# Patient Record
Sex: MALE | Race: BLACK OR AFRICAN AMERICAN | NOT HISPANIC OR LATINO | Employment: FULL TIME | URBAN - METROPOLITAN AREA
[De-identification: names, ages, dates, MRNs, and addresses within clinical notes are randomized per-mention and may not be internally consistent; named-entity substitution may affect disease eponyms.]

---

## 2018-03-03 ENCOUNTER — HOSPITAL ENCOUNTER (EMERGENCY)
Facility: OTHER | Age: 23
Discharge: HOME | End: 2018-03-03
Attending: EMERGENCY MEDICINE
Payer: COMMERCIAL

## 2018-03-03 VITALS
TEMPERATURE: 98 F | HEART RATE: 88 BPM | SYSTOLIC BLOOD PRESSURE: 147 MMHG | OXYGEN SATURATION: 97 % | BODY MASS INDEX: 37.11 KG/M2 | WEIGHT: 280 LBS | RESPIRATION RATE: 16 BRPM | HEIGHT: 73 IN | DIASTOLIC BLOOD PRESSURE: 85 MMHG

## 2018-03-03 DIAGNOSIS — J02.9 VIRAL PHARYNGITIS: ICD-10-CM

## 2018-03-03 DIAGNOSIS — G44.209 TENSION HEADACHE: Primary | ICD-10-CM

## 2018-03-03 LAB
CTP QC/QA: YES
S PYO RRNA THROAT QL PROBE: NEGATIVE

## 2018-03-03 PROCEDURE — 63600175 PHARM REV CODE 636 W HCPCS: Performed by: NURSE PRACTITIONER

## 2018-03-03 PROCEDURE — 99283 EMERGENCY DEPT VISIT LOW MDM: CPT | Mod: 25

## 2018-03-03 PROCEDURE — 87880 STREP A ASSAY W/OPTIC: CPT

## 2018-03-03 PROCEDURE — 25000003 PHARM REV CODE 250: Performed by: NURSE PRACTITIONER

## 2018-03-03 PROCEDURE — 96372 THER/PROPH/DIAG INJ SC/IM: CPT

## 2018-03-03 RX ORDER — DEXAMETHASONE SODIUM PHOSPHATE 4 MG/ML
8 INJECTION, SOLUTION INTRA-ARTICULAR; INTRALESIONAL; INTRAMUSCULAR; INTRAVENOUS; SOFT TISSUE
Status: COMPLETED | OUTPATIENT
Start: 2018-03-03 | End: 2018-03-03

## 2018-03-03 RX ORDER — IBUPROFEN 400 MG/1
800 TABLET ORAL
Status: COMPLETED | OUTPATIENT
Start: 2018-03-03 | End: 2018-03-03

## 2018-03-03 RX ADMIN — IBUPROFEN 800 MG: 400 TABLET, FILM COATED ORAL at 11:03

## 2018-03-03 RX ADMIN — DEXAMETHASONE SODIUM PHOSPHATE 8 MG: 4 INJECTION, SOLUTION INTRAMUSCULAR; INTRAVENOUS at 11:03

## 2018-03-03 NOTE — ED PROVIDER NOTES
"Encounter Date: 3/3/2018       History     Chief Complaint   Patient presents with    Headache     pt states "I started having a headache yesterday at work and it has gotten worse" "I took ibuprofen but it hasn't helped"    Sore Throat     pt states "I've been having a sore throat x 4 days"     The history is provided by the patient. No  was used.   Headache    This is a new problem. The current episode started yesterday. The problem occurs intermittently. The problem has been waxing and waning. The pain is located in the parietal and right unilateral region. The pain does not radiate. The pain quality is similar to prior headaches. The quality of the pain is described as aching. The pain is at a severity of 6/10. Associated symptoms include a sore throat. Pertinent negatives include no abdominal pain, abnormal behavior, anorexia, back pain, blurred vision, coughing, dizziness, drainage, ear pain, eye pain, eye redness, eye watering, facial sweating, fever, hearing loss, insomnia, loss of balance, muscle aches, nausea, neck pain, numbness, phonophobia, photophobia, rhinorrhea, sinus pressure, swollen glands, tingling, tinnitus, visual change, vomiting, weakness or weight loss. The symptoms are aggravated by activity, noise and bright light. He has tried NSAIDs (Patient reports ibuprofen 800 mg resolved the headache.  Patient states that the headache came back however.  Patient did not take another ibuprofen for the headache recurrence.) for the symptoms. The treatment provided significant relief. (Patient reports that he does not drink 68 glasses of water daily.  Patient does report a past medical history of headaches.)   Sore Throat   This is a new problem. The current episode started more than 2 days ago. The problem occurs constantly. The problem has not changed since onset.Associated symptoms include headaches. Pertinent negatives include no abdominal pain. The symptoms are aggravated by " swallowing and drinking.     Review of patient's allergies indicates:  No Known Allergies  History reviewed. No pertinent past medical history.  History reviewed. No pertinent surgical history.  Family History   Problem Relation Age of Onset    Hypertension Mother      Social History   Substance Use Topics    Smoking status: Never Smoker    Smokeless tobacco: Never Used    Alcohol use Not on file     Review of Systems   Constitutional: Negative.  Negative for fever and weight loss.   HENT: Positive for sore throat. Negative for congestion, ear pain, hearing loss, rhinorrhea, sinus pressure and tinnitus.    Eyes: Negative.  Negative for blurred vision, photophobia, pain and redness.   Respiratory: Negative.  Negative for cough.    Cardiovascular: Negative.    Gastrointestinal: Negative.  Negative for abdominal pain, anorexia, nausea and vomiting.   Genitourinary: Negative.    Musculoskeletal: Negative.  Negative for back pain and neck pain.   Skin: Negative.    Allergic/Immunologic: Negative.    Neurological: Positive for headaches. Negative for dizziness, tingling, weakness, numbness and loss of balance.   Psychiatric/Behavioral: Negative.  The patient does not have insomnia.    All other systems reviewed and are negative.      Physical Exam     Initial Vitals [03/03/18 1110]   BP Pulse Resp Temp SpO2   138/60 98 18 99.2 °F (37.3 °C) 97 %      MAP       86         Physical Exam    Nursing note and vitals reviewed.  Constitutional: He appears well-developed and well-nourished. He is not diaphoretic.  Non-toxic appearance. He does not appear ill. No distress.   HENT:   Head: Normocephalic and atraumatic.   Nose: No mucosal edema or rhinorrhea. Right sinus exhibits no maxillary sinus tenderness and no frontal sinus tenderness. Left sinus exhibits no maxillary sinus tenderness and no frontal sinus tenderness.   Mouth/Throat: Uvula is midline, oropharynx is clear and moist and mucous membranes are normal. No uvula  swelling. No oropharyngeal exudate, posterior oropharyngeal edema, posterior oropharyngeal erythema or tonsillar abscesses.   Eyes: Conjunctivae are normal.   Neck: Normal range of motion.   Cardiovascular: Normal rate, regular rhythm, normal heart sounds and intact distal pulses. Exam reveals no gallop and no friction rub.    No murmur heard.  Pulmonary/Chest: Breath sounds normal. No respiratory distress. He has no wheezes. He has no rhonchi. He has no rales. He exhibits no tenderness.   Musculoskeletal: Normal range of motion.   Neurological: He is alert and oriented to person, place, and time. He has normal strength. He displays no atrophy and no tremor. No cranial nerve deficit or sensory deficit. He exhibits normal muscle tone. He displays a negative Romberg sign. He displays no seizure activity. Coordination and gait normal. GCS eye subscore is 4. GCS verbal subscore is 5. GCS motor subscore is 6.   Skin: Skin is warm and dry. No rash noted.   Psychiatric: He has a normal mood and affect. His behavior is normal. Judgment and thought content normal.         ED Course   Procedures  Labs Reviewed   POCT RAPID STREP A             Medical Decision Making:   Initial Assessment:   Tension headache, viral pharyngitis  Differential Diagnosis:   Strep, URI  Clinical Tests:   Lab Tests: Ordered and Reviewed       <> Summary of Lab: Rapid strep negative  ED Management:  Decadron IM and ibuprofen 800 mg given to the patient in the ER.  After 30 minutes the patient reports resolution of pain and states he feels better.  Patient will be discharged home with instructions to take ibuprofen 800 mg as needed for headache, rest, refrain from strenuous activity, drink 6-8 glasses of water daily.  The patient is also instructed that his sore throat is likely viral and should subside on its own over time.  Patient is instructed to eat and drink foods and liquids at room temperature.  Patient also is instructed to return to the ER  as needed if symptoms worsen or fail to improve as well as follow-up with his primary care provider in 2 days.  The patient verbalized an understanding of discharge instructions and treatment plan.                      Clinical Impression:   The primary encounter diagnosis was Tension headache. A diagnosis of Viral pharyngitis was also pertinent to this visit.                           Toussaint Battley III, TIM  03/03/18 1247

## 2018-10-07 ENCOUNTER — HOSPITAL ENCOUNTER (EMERGENCY)
Facility: HOSPITAL | Age: 23
Discharge: HOME | End: 2018-10-07
Attending: EMERGENCY MEDICINE
Payer: COMMERCIAL

## 2018-10-07 VITALS
BODY MASS INDEX: 37.11 KG/M2 | HEIGHT: 73 IN | SYSTOLIC BLOOD PRESSURE: 144 MMHG | TEMPERATURE: 98 F | WEIGHT: 280 LBS | DIASTOLIC BLOOD PRESSURE: 88 MMHG | HEART RATE: 84 BPM | RESPIRATION RATE: 18 BRPM | OXYGEN SATURATION: 95 %

## 2018-10-07 DIAGNOSIS — K52.9 GASTROENTERITIS: Primary | ICD-10-CM

## 2018-10-07 PROCEDURE — 99283 EMERGENCY DEPT VISIT LOW MDM: CPT

## 2018-10-07 RX ORDER — ONDANSETRON 4 MG/1
4 TABLET, ORALLY DISINTEGRATING ORAL EVERY 8 HOURS PRN
Qty: 14 TABLET | Refills: 1 | Status: SHIPPED | OUTPATIENT
Start: 2018-10-07

## 2018-10-07 NOTE — ED PROVIDER NOTES
"Encounter Date: 10/7/2018       History     Chief Complaint   Patient presents with    Emesis     "I was supposed to be at work for 5 this morning but when I woke up I was vomiting and had loose bm". Reports 3 episodes of emesis and a "good amount" of loose stools.     Chief complaint:  Vomiting and diarrhea  23-year-old male awoke at 3:00 a.m. with upset stomach.  Patient went to the restroom and had diarrhea.  He has had diarrhea and vomiting intermittently between 3 and 5:00 a.m..  Last time he vomited was at 4 a.m..  His last diarrheal stool was 5:00 a.m..  Patient has no complaints at this time.  He says that he called off work today and needs a note.  No fever, weakness or dizziness.  No sick contacts.  He did eat Chinese food around 830 last night.  For      The history is provided by the patient.     Review of patient's allergies indicates:  No Known Allergies  History reviewed. No pertinent past medical history.  History reviewed. No pertinent surgical history.  Family History   Problem Relation Age of Onset    Hypertension Mother      Social History     Tobacco Use    Smoking status: Never Smoker    Smokeless tobacco: Never Used   Substance Use Topics    Alcohol use: Yes     Frequency: Never     Comment: 3 x a week    Drug use: No     Review of Systems   Constitutional: Negative for fever.   Respiratory: Negative for shortness of breath.    Cardiovascular: Negative for chest pain.   Gastrointestinal: Negative for abdominal pain, diarrhea, nausea and vomiting.   Genitourinary: Negative for decreased urine volume.   Neurological: Negative for dizziness and weakness.       Physical Exam     Initial Vitals [10/07/18 0918]   BP Pulse Resp Temp SpO2   (!) 144/88 84 18 98.2 °F (36.8 °C) 95 %      MAP       --         Physical Exam    Constitutional: He appears well-developed and well-nourished.   HENT:   Head: Normocephalic and atraumatic.   Mouth/Throat: Oropharynx is clear and moist.   Eyes: EOM are " normal. Pupils are equal, round, and reactive to light.   Neck: Neck supple.   Cardiovascular: Normal rate, regular rhythm and normal heart sounds.   Pulmonary/Chest: Breath sounds normal.   Abdominal: Soft. There is no tenderness. There is no rebound and no guarding.   Musculoskeletal: Normal range of motion.   Neurological: He is alert and oriented to person, place, and time. No cranial nerve deficit.   Skin: Skin is warm and dry.   Psychiatric: He has a normal mood and affect.         ED Course   Procedures  Labs Reviewed - No data to display       Imaging Results    None          Medical Decision Making:   Initial Assessment:   23-year-old complains of vomiting and diarrhea from 3-5 a.m. this morning.  Patient's symptoms have now completely resolved.  Abdomen is soft and nontender.  ED Management:  Patient was given a p.o. challenge.  No vomiting. He was advised on a clear liquid diet.  He will be discharged on Zofran and advised to take Imodium as needed for the diarrhea.                      Clinical Impression:   The encounter diagnosis was Gastroenteritis.                             Mariola Seals MD  10/07/18 0937

## 2018-10-07 NOTE — DISCHARGE INSTRUCTIONS
Clear liquids For 24 hr.  You may take Imodium as needed for diarrhea.  Return here for any concerns

## 2018-12-30 ENCOUNTER — HOSPITAL ENCOUNTER (EMERGENCY)
Facility: HOSPITAL | Age: 23
Discharge: HOME | End: 2018-12-30
Attending: EMERGENCY MEDICINE
Payer: COMMERCIAL

## 2018-12-30 VITALS
SYSTOLIC BLOOD PRESSURE: 124 MMHG | TEMPERATURE: 99 F | WEIGHT: 300 LBS | RESPIRATION RATE: 22 BRPM | HEART RATE: 108 BPM | DIASTOLIC BLOOD PRESSURE: 80 MMHG | OXYGEN SATURATION: 96 % | BODY MASS INDEX: 39.76 KG/M2 | HEIGHT: 73 IN

## 2018-12-30 DIAGNOSIS — J06.9 ACUTE URI: Primary | ICD-10-CM

## 2018-12-30 DIAGNOSIS — E86.0 MILD DEHYDRATION: ICD-10-CM

## 2018-12-30 LAB
CTP QC/QA: YES
FLUAV AG NPH QL: NEGATIVE
FLUBV AG NPH QL: NEGATIVE

## 2018-12-30 PROCEDURE — 99284 EMERGENCY DEPT VISIT MOD MDM: CPT

## 2018-12-30 PROCEDURE — 25000003 PHARM REV CODE 250: Performed by: PHYSICIAN ASSISTANT

## 2018-12-30 PROCEDURE — 87804 INFLUENZA ASSAY W/OPTIC: CPT

## 2018-12-30 RX ORDER — ACETAMINOPHEN 325 MG/1
650 TABLET ORAL
Status: COMPLETED | OUTPATIENT
Start: 2018-12-30 | End: 2018-12-30

## 2018-12-30 RX ORDER — BENZONATATE 100 MG/1
100 CAPSULE ORAL 3 TIMES DAILY PRN
Qty: 20 CAPSULE | Refills: 0 | Status: SHIPPED | OUTPATIENT
Start: 2018-12-30 | End: 2019-01-09

## 2018-12-30 RX ORDER — IBUPROFEN 600 MG/1
600 TABLET ORAL
Status: COMPLETED | OUTPATIENT
Start: 2018-12-30 | End: 2018-12-30

## 2018-12-30 RX ADMIN — ACETAMINOPHEN 650 MG: 325 TABLET, FILM COATED ORAL at 08:12

## 2018-12-30 RX ADMIN — IBUPROFEN 600 MG: 600 TABLET ORAL at 08:12

## 2018-12-31 NOTE — ED PROVIDER NOTES
Encounter Date: 12/30/2018    SCRIBE #1 NOTE: I, Gonsalo Awan, am scribing for, and in the presence of,  LEANNA Retana. I have scribed the following portions of the note - Other sections scribed: HPI, ROS, PE.       History     Chief Complaint   Patient presents with    Cough     Productive cough x 2 days with headache and nasal congestion. Reports green mucus     This is a 23 y.o. male who presents to the ED with a complaint of coughing that began two days ago. Pt also reports an intermittent right sided headache, nasal congestion, and sore throat due to coughing for the past two days. Pt states his headaches are recurrent but notes his other symptoms are new problems. He has taken Liquid Tylenol (last dose at 5:00 p.m) without relief. He denies fever, ear pain, dental pain, CP, SOB, abdominal pain, nausea, vomiting, or diarrhea. Pt reports sick contact with his brother who has similar symptoms. He denies recent Abx use.      The history is provided by the patient.     Review of patient's allergies indicates:  No Known Allergies  History reviewed. No pertinent past medical history.  History reviewed. No pertinent surgical history.  Family History   Problem Relation Age of Onset    Hypertension Mother      Social History     Tobacco Use    Smoking status: Never Smoker    Smokeless tobacco: Never Used   Substance Use Topics    Alcohol use: Yes     Frequency: Never     Comment: 3 x a week    Drug use: No     Review of Systems   Constitutional: Negative for fever.   HENT: Positive for congestion and sore throat. Negative for dental problem and ear pain.    Respiratory: Positive for cough. Negative for shortness of breath.    Cardiovascular: Negative for chest pain.   Gastrointestinal: Negative for abdominal pain, diarrhea, nausea and vomiting.   Neurological: Positive for headaches.   All other systems reviewed and are negative.      Physical Exam     Initial Vitals [12/30/18 2022]   BP Pulse Resp Temp SpO2    130/80 (!) 124 18 99.7 °F (37.6 °C) 97 %      MAP       --         Physical Exam    Nursing note and vitals reviewed.  Constitutional: He appears well-developed and well-nourished.   HENT:   Head: Normocephalic and atraumatic.   Mouth/Throat: Uvula is midline and oropharynx is clear and moist. Mucous membranes are dry. No oropharyngeal exudate, posterior oropharyngeal edema or posterior oropharyngeal erythema.   Eyes: Conjunctivae and EOM are normal. Pupils are equal, round, and reactive to light.   Neck: Normal range of motion. Neck supple.   Cardiovascular: Regular rhythm, normal heart sounds and intact distal pulses. Tachycardia present.  Exam reveals no gallop and no friction rub.    No murmur heard.  Pulmonary/Chest: Breath sounds normal. No stridor. No respiratory distress. He has no wheezes. He has no rhonchi. He has no rales. He exhibits no tenderness.   Abdominal: Soft. Bowel sounds are normal. He exhibits no distension and no mass. There is no tenderness. There is no rebound and no guarding.   Musculoskeletal: Normal range of motion.   Lymphadenopathy:     He has no cervical adenopathy.   Neurological: He is alert and oriented to person, place, and time. No cranial nerve deficit or sensory deficit. GCS score is 15. GCS eye subscore is 4. GCS verbal subscore is 5. GCS motor subscore is 6.   Skin: Skin is warm and dry. Capillary refill takes less than 2 seconds. No rash noted.   Psychiatric: He has a normal mood and affect. His behavior is normal.         ED Course   Procedures  Labs Reviewed   POCT INFLUENZA A/B          Imaging Results    None          Medical Decision Making:   History:   Old Medical Records: I decided to obtain old medical records.  Clinical Tests:   Lab Tests: Ordered      This is an urgent evaluation of a 23 y.o. male presenting to the ED for cough. Denies abdominal pain and emesis. Afebrile. Patient is non-toxic appearing and in no acute distress. Spectrum of symptoms most  consistent with viral URI in this patient with reported sick contacts. No focal lung findings, hypoxia, or prolonged period of symptoms to warrant CXR at this time as PNA is highly unlikely. No wheezing or respiratory distress to suggest acute asthma exacerbation. 0/4 Centor criteria in the presence of typical viral URI symptoms makes acute bacterial pharyngitis/tonsilitis unlikely. No sinus TTP or purulent rhinorrhea to suggest acute bacterial rhinosinusitis at this time. No evidence of AOM, mastoiditis, PTA, Salvatore's, epiglottitis, and meningitis.       Discharged home with supportive care. I discussed the use of OTC medications for symptom control. I advised patient to maintain adequate hydration and advance diet as tolerated to maintain adequate nutrition.    I discussed with the patient the diagnosis, treatment plan, indications for return to the emergency department, and for expected follow-up. The patient verbalized an understanding. The patient is asked if there are any questions or concerns. We discuss the case, until all issues are addressed to the patients satisfaction. Patient understands and is agreeable to the plan.          Scribe Attestation:   Scribe #1: I performed the above scribed service and the documentation accurately describes the services I performed. I attest to the accuracy of the note.               Clinical Impression:     1. Acute URI    2. Mild dehydration                                   Carlos Marie PA-C  12/30/18 6744

## 2020-11-14 ENCOUNTER — HOSPITAL ENCOUNTER (EMERGENCY)
Facility: HOSPITAL | Age: 25
Discharge: HOME | End: 2020-11-14
Attending: EMERGENCY MEDICINE
Payer: COMMERCIAL

## 2020-11-14 VITALS
OXYGEN SATURATION: 98 % | TEMPERATURE: 98 F | RESPIRATION RATE: 16 BRPM | SYSTOLIC BLOOD PRESSURE: 142 MMHG | DIASTOLIC BLOOD PRESSURE: 67 MMHG | HEART RATE: 89 BPM | WEIGHT: 315 LBS | BODY MASS INDEX: 43.54 KG/M2

## 2020-11-14 DIAGNOSIS — N39.0 ACUTE UTI: Primary | ICD-10-CM

## 2020-11-14 LAB
BILIRUBIN, POC UA: ABNORMAL
BLOOD, POC UA: ABNORMAL
CLARITY, POC UA: CLEAR
COLOR, POC UA: YELLOW
GLUCOSE, POC UA: NEGATIVE
KETONES, POC UA: NEGATIVE
LEUKOCYTE EST, POC UA: ABNORMAL
NITRITE, POC UA: NEGATIVE
PH UR STRIP: 6 [PH]
PROTEIN, POC UA: ABNORMAL
SPECIFIC GRAVITY, POC UA: >=1.03
UROBILINOGEN, POC UA: 0.2 E.U./DL

## 2020-11-14 PROCEDURE — 99284 EMERGENCY DEPT VISIT MOD MDM: CPT | Mod: 25,ER

## 2020-11-14 PROCEDURE — 63600175 PHARM REV CODE 636 W HCPCS: Mod: ER | Performed by: EMERGENCY MEDICINE

## 2020-11-14 PROCEDURE — 87086 URINE CULTURE/COLONY COUNT: CPT

## 2020-11-14 PROCEDURE — 63700000 PHARM REV CODE 250 ALT 637 W/O HCPCS: Mod: ER | Performed by: EMERGENCY MEDICINE

## 2020-11-14 PROCEDURE — 81003 URINALYSIS AUTO W/O SCOPE: CPT | Mod: ER

## 2020-11-14 PROCEDURE — 96372 THER/PROPH/DIAG INJ SC/IM: CPT | Mod: ER

## 2020-11-14 RX ORDER — ACETAMINOPHEN 500 MG
1000 TABLET ORAL EVERY 6 HOURS PRN
Qty: 30 TABLET | Refills: 0 | Status: SHIPPED | OUTPATIENT
Start: 2020-11-14

## 2020-11-14 RX ORDER — AZITHROMYCIN 250 MG/1
1000 TABLET, FILM COATED ORAL
Status: COMPLETED | OUTPATIENT
Start: 2020-11-14 | End: 2020-11-14

## 2020-11-14 RX ORDER — CEFTRIAXONE 250 MG/1
250 INJECTION, POWDER, FOR SOLUTION INTRAMUSCULAR; INTRAVENOUS
Status: COMPLETED | OUTPATIENT
Start: 2020-11-14 | End: 2020-11-14

## 2020-11-14 RX ORDER — DOXYCYCLINE 100 MG/1
100 CAPSULE ORAL 2 TIMES DAILY
Qty: 20 CAPSULE | Refills: 0 | Status: SHIPPED | OUTPATIENT
Start: 2020-11-14 | End: 2020-11-24

## 2020-11-14 RX ORDER — PHENAZOPYRIDINE HYDROCHLORIDE 200 MG/1
200 TABLET, FILM COATED ORAL 3 TIMES DAILY PRN
Qty: 6 TABLET | Refills: 0 | Status: SHIPPED | OUTPATIENT
Start: 2020-11-14 | End: 2020-11-16

## 2020-11-14 RX ORDER — IBUPROFEN 600 MG/1
600 TABLET ORAL EVERY 6 HOURS PRN
Qty: 20 TABLET | Refills: 0 | OUTPATIENT
Start: 2020-11-14 | End: 2020-12-13

## 2020-11-14 RX ADMIN — CEFTRIAXONE SODIUM 250 MG: 250 INJECTION, POWDER, FOR SOLUTION INTRAMUSCULAR; INTRAVENOUS at 09:11

## 2020-11-14 RX ADMIN — AZITHROMYCIN MONOHYDRATE 1000 MG: 250 TABLET ORAL at 09:11

## 2020-11-14 NOTE — ED TRIAGE NOTES
Pt to er after noticing an enlarged vein on his penis last night--states that it only appears when it is erect--denies trauma, bleeding, discharge, and dysuria

## 2020-11-14 NOTE — ED PROVIDER NOTES
"Encounter Date: 11/14/2020    SCRIBE #1 NOTE: I, Gretchen Benitez, am scribing for, and in the presence of,  Dr. Santiago. I have scribed the following portions of the note - Other sections scribed: HPI, ROS, PE.       History     Chief Complaint   Patient presents with    penile problem.     Pt states," i noticed last night that I had a very large vein on my penis."     Brandan Santiago is a 25 y.o. male who presents to the ED complaining of "large vein" to penis, which he first noticed last night during intercourse. Patient denies rash, penile swelling, penile pain, penile discharge, or dysuria. He notes that the vein does not appear when his penis is flaccid. He reports that he has been with his current partner for 3 years, and denies any concern for STD. Denies history of hypertension.    The history is provided by the patient. No  was used.     Review of patient's allergies indicates:  No Known Allergies  History reviewed. No pertinent past medical history.  History reviewed. No pertinent surgical history.  Family History   Problem Relation Age of Onset    Hypertension Mother      Social History     Tobacco Use    Smoking status: Never Smoker    Smokeless tobacco: Never Used   Substance Use Topics    Alcohol use: Yes     Frequency: Never     Comment: 3 x a week    Drug use: No     Review of Systems   Respiratory: Negative for shortness of breath.    Cardiovascular: Negative for chest pain.   Genitourinary: Negative for discharge, dysuria, penile pain and penile swelling.        + Vein to penis.   Skin: Negative for rash.   All other systems reviewed and are negative.      Physical Exam     Patient gave consent to have physical exam performed.    Initial Vitals [11/14/20 0829]   BP Pulse Resp Temp SpO2   (!) 173/97 100 16 98 °F (36.7 °C) 97 %      MAP       --         Physical Exam    Nursing note and vitals reviewed.  Constitutional: He appears well-developed and well-nourished.   HENT:   Head: " Normocephalic and atraumatic.   Right Ear: External ear normal.   Left Ear: External ear normal.   Nose: Nose normal.   Mouth/Throat: Oropharynx is clear and moist.   Eyes: Conjunctivae and EOM are normal. Pupils are equal, round, and reactive to light.   Neck: Normal range of motion. Neck supple.   Cardiovascular: Normal rate, regular rhythm, normal heart sounds and intact distal pulses. Exam reveals no gallop and no friction rub.    No murmur heard.  Pulmonary/Chest: Breath sounds normal. No stridor. No respiratory distress. He has no wheezes. He has no rhonchi. He has no rales. He exhibits no tenderness.   Abdominal: Soft. Bowel sounds are normal. He exhibits no distension and no mass. There is no abdominal tenderness. There is no rigidity, no rebound and no guarding.   Genitourinary:    Penis normal.   Circumcised. No penile tenderness. No discharge found.    Genitourinary Comments: I have performed the  Exam and was chaperoned by Ty Casiano RN.     Musculoskeletal: Normal range of motion.   Neurological: He is alert and oriented to person, place, and time. No cranial nerve deficit or sensory deficit. GCS score is 15. GCS eye subscore is 4. GCS verbal subscore is 5. GCS motor subscore is 6.   Skin: Skin is warm and dry. Capillary refill takes less than 2 seconds. No rash noted.   Psychiatric: He has a normal mood and affect. His behavior is normal.         ED Course   Procedures  Labs Reviewed   POCT URINALYSIS W/O SCOPE - Abnormal; Notable for the following components:       Result Value    Bilirubin, UA 1+ (*)     Spec Grav UA >=1.030 (*)     Blood, UA Trace-intact (*)     Protein, UA 2+ (*)     Leukocytes, UA Trace (*)     All other components within normal limits   CULTURE, URINE   C. TRACHOMATIS/N. GONORRHOEAE BY AMP DNA   POCT URINALYSIS W/O SCOPE          Imaging Results    None          Medical Decision Making:   History:   Old Medical Records: I decided to obtain old medical records.  Clinical  Tests:   Lab Tests: Ordered and Reviewed    Chief complaint: vein to penis  Differential diagnosis: penile lesion, penile pain, dysuria, hematuria    Treatment in the ED: PE, azithromycin tablet 1,000 mg, ceftriaxone injection 250 mg  Patient reports feeling better after treatment in the ER.      Discussed treatment, prescriptions, labs, and imaging results. Patient states that he has no concern for STD and does not want to be tested for STDs today.    Discharge home with   acetaminophen (TYLENOL) 500 MG tablet     ibuprofen (ADVIL,MOTRIN) 600 MG tablet     phenazopyridine (PYRIDIUM) 200 MG tablet     doxycycline (VIBRAMYCIN) 100 MG Cap   Fill and take prescriptions as directed.  Return to the ED if symptoms worsen or do not resolve.   Answered questions and discussed discharge plan.    Patient feels better and is ready for discharge.  Follow up with PCP/specialist in 1 day.            Scribe Attestation:   Scribe #1: I performed the above scribed service and the documentation accurately describes the services I performed. I attest to the accuracy of the note.     I, Dr. Lizbeth Santiago, personally performed the services described in this documentation. This document was produced by a scribe under my direction and in my presence. All medical record entries made by the scribe were at my direction and in my presence.  I have reviewed the chart and agree that the record reflects my personal performance and is accurate and complete. Lizbeth Santiago DO.     11/14/2020 9:51 AM                    Clinical Impression:     ICD-10-CM ICD-9-CM   1. Acute UTI  N39.0 599.0                          ED Disposition Condition    Discharge Stable        ED Prescriptions     Medication Sig Dispense Start Date End Date Auth. Provider    doxycycline (VIBRAMYCIN) 100 MG Cap Take 1 capsule (100 mg total) by mouth 2 (two) times daily. for 10 days 20 capsule 11/14/2020 11/24/2020 Lizbeth Santiago DO    phenazopyridine (PYRIDIUM) 200 MG  tablet Take 1 tablet (200 mg total) by mouth 3 (three) times daily as needed for Pain. 6 tablet 11/14/2020 11/16/2020 Lizbeth Santiago DO    ibuprofen (ADVIL,MOTRIN) 600 MG tablet Take 1 tablet (600 mg total) by mouth every 6 (six) hours as needed for Pain (Take with food as needed for mild-to-moderate pain). 20 tablet 11/14/2020  Lizbeth Santiago DO    acetaminophen (TYLENOL) 500 MG tablet Take 2 tablets (1,000 mg total) by mouth every 6 (six) hours as needed for Pain. 30 tablet 11/14/2020  Lizbeth Santiago DO        Follow-up Information     Follow up With Specialties Details Why Contact Info    Ariel Bunch MD Internal Medicine Schedule an appointment as soon as possible for a visit in 1 day Please establish a primary care physician [address] [facility] [place] [address] [phone]      Dena Church MD Internal Medicine, Wound Care Schedule an appointment as soon as possible for a visit  Please establish a primary care physician [address] [place] [address]  [address]  [place] [address] [phone]      THOMAS Sinha MD Urology Schedule an appointment as soon as possible for a visit in 1 day  [address]  [address]  [place] [address]  [phone]                                         Lizbeth Santiago DO  11/14/20 0951

## 2020-11-15 NOTE — PROGRESS NOTES
I was informed by lab that the gc/chlamydia was not able to be completed. Dr. Avilez aware. Pt was treated prior to d/c. No further action necessary   warm

## 2020-11-16 LAB — BACTERIA UR CULT: NORMAL

## 2020-12-13 ENCOUNTER — HOSPITAL ENCOUNTER (EMERGENCY)
Facility: HOSPITAL | Age: 25
Discharge: HOME | End: 2020-12-13
Attending: EMERGENCY MEDICINE
Payer: COMMERCIAL

## 2020-12-13 VITALS
HEART RATE: 89 BPM | HEIGHT: 73 IN | OXYGEN SATURATION: 99 % | BODY MASS INDEX: 41.75 KG/M2 | SYSTOLIC BLOOD PRESSURE: 138 MMHG | WEIGHT: 315 LBS | DIASTOLIC BLOOD PRESSURE: 88 MMHG | RESPIRATION RATE: 18 BRPM | TEMPERATURE: 99 F

## 2020-12-13 DIAGNOSIS — R03.0 ELEVATED BLOOD PRESSURE READING IN OFFICE WITHOUT DIAGNOSIS OF HYPERTENSION: ICD-10-CM

## 2020-12-13 DIAGNOSIS — H66.91 RIGHT OTITIS MEDIA, UNSPECIFIED OTITIS MEDIA TYPE: Primary | ICD-10-CM

## 2020-12-13 PROCEDURE — 63600175 PHARM REV CODE 636 W HCPCS: Mod: ER | Performed by: EMERGENCY MEDICINE

## 2020-12-13 PROCEDURE — 99284 EMERGENCY DEPT VISIT MOD MDM: CPT | Mod: 25,ER

## 2020-12-13 PROCEDURE — 96372 THER/PROPH/DIAG INJ SC/IM: CPT | Mod: ER

## 2020-12-13 PROCEDURE — 25000003 PHARM REV CODE 250: Mod: ER | Performed by: EMERGENCY MEDICINE

## 2020-12-13 RX ORDER — AMOXICILLIN AND CLAVULANATE POTASSIUM 875; 125 MG/1; MG/1
1 TABLET, FILM COATED ORAL 2 TIMES DAILY
Qty: 20 TABLET | Refills: 0 | Status: SHIPPED | OUTPATIENT
Start: 2020-12-13

## 2020-12-13 RX ORDER — AMOXICILLIN AND CLAVULANATE POTASSIUM 875; 125 MG/1; MG/1
1 TABLET, FILM COATED ORAL
Status: COMPLETED | OUTPATIENT
Start: 2020-12-13 | End: 2020-12-13

## 2020-12-13 RX ORDER — KETOROLAC TROMETHAMINE 30 MG/ML
15 INJECTION, SOLUTION INTRAMUSCULAR; INTRAVENOUS
Status: COMPLETED | OUTPATIENT
Start: 2020-12-13 | End: 2020-12-13

## 2020-12-13 RX ORDER — FAMOTIDINE 20 MG/1
20 TABLET, FILM COATED ORAL 2 TIMES DAILY
Qty: 60 TABLET | Refills: 0 | Status: SHIPPED | OUTPATIENT
Start: 2020-12-13 | End: 2021-12-13

## 2020-12-13 RX ORDER — MELOXICAM 15 MG/1
15 TABLET ORAL DAILY
Qty: 30 TABLET | Refills: 0 | Status: SHIPPED | OUTPATIENT
Start: 2020-12-13

## 2020-12-13 RX ORDER — CETIRIZINE HYDROCHLORIDE 10 MG/1
10 TABLET ORAL DAILY
Qty: 30 TABLET | Refills: 0 | Status: SHIPPED | OUTPATIENT
Start: 2020-12-13 | End: 2021-12-13

## 2020-12-13 RX ADMIN — AMOXICILLIN AND CLAVULANATE POTASSIUM 1 TABLET: 875; 125 TABLET, FILM COATED ORAL at 05:12

## 2020-12-13 RX ADMIN — KETOROLAC TROMETHAMINE 15 MG: 30 INJECTION, SOLUTION INTRAMUSCULAR at 05:12

## 2020-12-13 NOTE — ED PROVIDER NOTES
Encounter Date: 12/13/2020       History     Chief Complaint   Patient presents with    Otalgia     PT C/O RIGHT EAR PAIN AND FULLNESS ONSET YESTERDAY AM.      25 y.o. male History reviewed. No pertinent past medical history.     Endorses right ear pain and fullness since yesterday notes right ear pain when he tries to clear his ears.  Denies recent swimming or airplane travel or scuba diving.     Denies nasal congestion headache sore throat cough diarrhea dysuria nausea vomiting or other complaints thinks he may have a right ear infection        Review of patient's allergies indicates:  No Known Allergies  History reviewed. No pertinent past medical history.  History reviewed. No pertinent surgical history.  Family History   Problem Relation Age of Onset    Hypertension Mother      Social History     Tobacco Use    Smoking status: Never Smoker    Smokeless tobacco: Never Used   Substance Use Topics    Alcohol use: Yes     Frequency: Never     Comment: 3 x a week    Drug use: No     Review of Systems   Constitutional: Negative for fever.   HENT: Negative for sore throat.    Respiratory: Negative for shortness of breath.    Cardiovascular: Negative for chest pain.   Gastrointestinal: Negative for nausea.   Genitourinary: Negative for dysuria.   Musculoskeletal: Negative for back pain.   Skin: Negative for rash.   Neurological: Negative for weakness.   Hematological: Does not bruise/bleed easily.   All other systems reviewed and are negative.      Physical Exam     Initial Vitals [12/13/20 1636]   BP Pulse Resp Temp SpO2   (!) 146/87 88 20 99.3 °F (37.4 °C) 98 %      MAP       --         Physical Exam    Nursing note and vitals reviewed.  Constitutional: He appears well-developed and well-nourished.   HENT:   Head: Normocephalic and atraumatic.   Eyes: EOM are normal. Pupils are equal, round, and reactive to light.   Cardiovascular: Normal rate and regular rhythm.   Pulmonary/Chest: Effort normal.   Abdominal:  He exhibits no distension.   Musculoskeletal: No tenderness or edema.   Neurological: He is alert and oriented to person, place, and time. No cranial nerve deficit.   Skin: Skin is warm and dry.   Psychiatric: He has a normal mood and affect.       Right TM thickened and erythematous consistent with otitis media left TM clear  ED Course   Procedures  Labs Reviewed - No data to display       Imaging Results    None                                      Clinical Impression:     ICD-10-CM ICD-9-CM   1. Right otitis media, unspecified otitis media type  H66.91 382.9   2. Elevated blood pressure reading in office without diagnosis of hypertension  R03.0 796.2                          ED Disposition Condition    Discharge Stable        ED Prescriptions     Medication Sig Dispense Start Date End Date Auth. Provider    amoxicillin-clavulanate 875-125mg (AUGMENTIN) 875-125 mg per tablet Take 1 tablet by mouth 2 (two) times daily. 20 tablet 12/13/2020  Jono Avilez MD    meloxicam (MOBIC) 15 MG tablet Take 1 tablet (15 mg total) by mouth once daily. 30 tablet 12/13/2020  Jono Avilez MD    famotidine (PEPCID) 20 MG tablet Take 1 tablet (20 mg total) by mouth 2 (two) times daily. 60 tablet 12/13/2020 12/13/2021 Jono Avilez MD    cetirizine (ZYRTEC) 10 MG tablet Take 1 tablet (10 mg total) by mouth once daily. 30 tablet 12/13/2020 12/13/2021 Jono Avilez MD        Follow-up Information     Follow up With Specialties Details Why Contact Info    [facility]    [address] [place] [address]  [phone]                                         Jono Avilez MD  12/13/20 2543

## 2020-12-13 NOTE — DISCHARGE INSTRUCTIONS
Thank you for coming to our Emergency Department today. It is important to remember that some problems are difficult to diagnose and may not be found during your first visit. Be sure to follow up with your primary care doctor and review any labs/imaging that was performed with them. If you do not have a primary care doctor, you may contact the one listed on your discharge paperwork or you may also call the [facility] Appointment Desk at [phone] to schedule an appointment with one.     All medications may potentially have side effects and it is impossible to predict which medications may give you side effects. If you feel that you are having a negative effect of any medication you should immediately stop taking them and seek medical attention.    Return to the ER with any questions/concerns, new/concerning symptoms, worsening or failure to improve. Do not drive or make any important decisions for 24 hours if you have received any pain medications, sedatives or mood altering drugs during your ER visit.

## 2021-04-12 ENCOUNTER — PATIENT MESSAGE (OUTPATIENT)
Dept: RESEARCH | Facility: HOSPITAL | Age: 26
End: 2021-04-12

## 2022-05-16 ENCOUNTER — HOSPITAL ENCOUNTER (EMERGENCY)
Facility: HOSPITAL | Age: 27
Discharge: HOME | End: 2022-05-16
Attending: EMERGENCY MEDICINE
Payer: COMMERCIAL

## 2022-05-16 VITALS
WEIGHT: 310 LBS | SYSTOLIC BLOOD PRESSURE: 161 MMHG | RESPIRATION RATE: 16 BRPM | OXYGEN SATURATION: 95 % | TEMPERATURE: 98 F | HEART RATE: 81 BPM | DIASTOLIC BLOOD PRESSURE: 95 MMHG | HEIGHT: 72 IN | BODY MASS INDEX: 41.99 KG/M2

## 2022-05-16 DIAGNOSIS — S39.012A LUMBAR STRAIN, INITIAL ENCOUNTER: Primary | ICD-10-CM

## 2022-05-16 PROCEDURE — 99284 EMERGENCY DEPT VISIT MOD MDM: CPT | Mod: ER

## 2022-05-16 PROCEDURE — 63600175 PHARM REV CODE 636 W HCPCS: Mod: ER | Performed by: EMERGENCY MEDICINE

## 2022-05-16 PROCEDURE — 25000003 PHARM REV CODE 250: Mod: ER | Performed by: NURSE PRACTITIONER

## 2022-05-16 PROCEDURE — 96372 THER/PROPH/DIAG INJ SC/IM: CPT | Performed by: EMERGENCY MEDICINE

## 2022-05-16 RX ORDER — LIDOCAINE 50 MG/G
1 PATCH TOPICAL
Status: DISCONTINUED | OUTPATIENT
Start: 2022-05-16 | End: 2022-05-16 | Stop reason: HOSPADM

## 2022-05-16 RX ORDER — KETOROLAC TROMETHAMINE 30 MG/ML
30 INJECTION, SOLUTION INTRAMUSCULAR; INTRAVENOUS
Status: COMPLETED | OUTPATIENT
Start: 2022-05-16 | End: 2022-05-16

## 2022-05-16 RX ORDER — METHOCARBAMOL 750 MG/1
1500 TABLET, FILM COATED ORAL 3 TIMES DAILY
Qty: 30 TABLET | Refills: 0 | Status: SHIPPED | OUTPATIENT
Start: 2022-05-16 | End: 2022-05-21

## 2022-05-16 RX ORDER — ORPHENADRINE CITRATE 30 MG/ML
60 INJECTION INTRAMUSCULAR; INTRAVENOUS
Status: COMPLETED | OUTPATIENT
Start: 2022-05-16 | End: 2022-05-16

## 2022-05-16 RX ORDER — IBUPROFEN 600 MG/1
600 TABLET ORAL EVERY 6 HOURS PRN
Qty: 20 TABLET | Refills: 0 | Status: SHIPPED | OUTPATIENT
Start: 2022-05-16 | End: 2023-11-27 | Stop reason: SDUPTHER

## 2022-05-16 RX ADMIN — ORPHENADRINE CITRATE 60 MG: 30 INJECTION INTRAMUSCULAR; INTRAVENOUS at 12:05

## 2022-05-16 RX ADMIN — LIDOCAINE 1 PATCH: 50 PATCH TOPICAL at 12:05

## 2022-05-16 RX ADMIN — KETOROLAC TROMETHAMINE 30 MG: 30 INJECTION, SOLUTION INTRAMUSCULAR at 12:05

## 2022-05-16 NOTE — ED PROVIDER NOTES
"Encounter Date: 5/16/2022    SCRIBE #1 NOTE: I, Best Casiano, am scribing for, and in the presence of,  Hollie Dueñas MD. I have scribed the following portions of the note - Other sections scribed: HPI, ROS, PE.       History     Chief Complaint   Patient presents with    Back Pain     Pt states," I have pain in my lower back since yesterday."     26 year old male presents to the emergency department with complaints of back pain onset yesterday, and he rates the pain 7/10 and describes it as intermittent. No radiation.  He denies associated nausea, tingling, or incontinence. He has had this pain before but it doesn't usually last longer than 1 day.  He does report repetitive lifting at work.  He attempted treatment with tylenol and ibuprofen with limited relief. He denies tobacco usage and admits to marijuana usage. There are no other signs or symptoms at this time.    The history is provided by the patient. No  was used.     Review of patient's allergies indicates:  No Known Allergies  History reviewed. No pertinent past medical history.  History reviewed. No pertinent surgical history.  Family History   Problem Relation Age of Onset    Hypertension Mother      Social History     Tobacco Use    Smoking status: Never Smoker    Smokeless tobacco: Never Used   Substance Use Topics    Alcohol use: Yes     Comment: 3 x a week    Drug use: No     Review of Systems   Constitutional: Negative for activity change, appetite change, chills and fever.   HENT: Negative for congestion, rhinorrhea, sneezing and sore throat.    Respiratory: Negative for cough, chest tightness, shortness of breath and wheezing.    Cardiovascular: Negative for chest pain and palpitations.   Gastrointestinal: Negative for abdominal pain, diarrhea, nausea and vomiting.        Negative for incontinence.   Genitourinary: Negative for difficulty urinating.   Musculoskeletal: Positive for back pain (lower.). Negative for gait " problem, joint swelling and neck pain.   Skin: Negative for rash.   Neurological: Negative for dizziness (or tingling.), syncope, weakness, light-headedness, numbness and headaches.   All other systems reviewed and are negative.      Physical Exam     Initial Vitals [05/16/22 1058]   BP Pulse Resp Temp SpO2   (!) 161/95 81 16 98 °F (36.7 °C) 95 %      MAP       --         Physical Exam    Nursing note and vitals reviewed.  Constitutional: He appears well-developed and well-nourished. No distress.   HENT:   Head: Normocephalic and atraumatic.   Eyes: Conjunctivae are normal.   Neck:   Normal range of motion.  Cardiovascular: Normal rate and regular rhythm.   No murmur heard.  Pulmonary/Chest: Breath sounds normal. No respiratory distress.   Abdominal: Bowel sounds are normal. He exhibits no distension.   Musculoskeletal:         General: Normal range of motion.      Cervical back: Normal range of motion.      Lumbar back: Tenderness (bilateral paraspinal muscle.) present. Normal range of motion.      Comments: Normal strength in lumbar back region.     Neurological: He is alert and oriented to person, place, and time.   Skin: Skin is warm and dry.   Psychiatric: He has a normal mood and affect. His behavior is normal.         ED Course   Procedures  Labs Reviewed - No data to display       Imaging Results    None          Medications   LIDOcaine 5 % patch 1 patch (1 patch Transdermal Patch Applied 5/16/22 1201)   ketorolac injection 30 mg (30 mg Intramuscular Given 5/16/22 1228)   orphenadrine injection 60 mg (60 mg Intramuscular Given 5/16/22 1228)     Medical Decision Making:   History:   Old Medical Records: I decided to obtain old medical records.  ED Management:  Lower back pain with lumbar muscle tenderness on exam - neuro intact.  Symptoms and exam are consistent with lumbar strain - treat with NSAID and muscle relaxer.          Scribe Attestation:   Scribe #1: I performed the above scribed service and the  documentation accurately describes the services I performed. I attest to the accuracy of the note.               I, Dr. Hollie Dueñas, personally performed the services described in this documentation.   All medical record entries made by the scribe were at my direction and in my presence.   I have reviewed the chart and agree that the record is accurate and complete.   Hollie Dueñas MD.  12:39 PM 05/16/2022   Clinical Impression:   Final diagnoses:  [S39.012A] Lumbar strain, initial encounter (Primary)          ED Disposition Condition    Discharge Stable        ED Prescriptions     Medication Sig Dispense Start Date End Date Auth. Provider    ibuprofen (ADVIL,MOTRIN) 600 MG tablet Take 1 tablet (600 mg total) by mouth every 6 (six) hours as needed for Pain. 20 tablet 5/16/2022  Hollie Dueñas MD    methocarbamoL (ROBAXIN) 750 MG Tab Take 2 tablets (1,500 mg total) by mouth 3 (three) times daily. for 5 days 30 tablet 5/16/2022 5/21/2022 Hollie Dueñas MD        Follow-up Information    None          Hollie Dueñas MD  05/16/22 4962

## 2022-05-16 NOTE — FIRST PROVIDER EVALUATION
" Emergency Department TeleTriage Encounter Note      CHIEF COMPLAINT    Chief Complaint   Patient presents with    Back Pain     Pt states," I have pain in my lower back since yesterday."       VITAL SIGNS   Initial Vitals [05/16/22 1058]   BP Pulse Resp Temp SpO2   (!) 161/95 81 16 98 °F (36.7 °C) 95 %      MAP       --            ALLERGIES    Review of patient's allergies indicates:  No Known Allergies    PROVIDER TRIAGE NOTE  This is a teletriage evaluation of a 26 y.o. male presenting to the ED complaining of nontraumatic low back pain since yesterday. Denies abd pain and urinary symptoms.     Initial orders will be placed and care will be transferred to an alternate provider when patient is roomed for a full evaluation. Any additional orders and the final disposition will be determined by that provider.           ORDERS  Labs Reviewed - No data to display    ED Orders (720h ago, onward)    Start Ordered     Status Ordering Provider    05/16/22 1230 05/16/22 1125  LIDOcaine 5 % patch 1 patch  Every 24 hours (non-standard times)         Ordered PERCY HAMMOND            Virtual Visit Note: The provider triage portion of this emergency department evaluation and documentation was performed via [employer], a HIPAA-compliant telemedicine application, in concert with a tele-presenter in the room. A face to face patient evaluation with one of my colleagues will occur once the patient is placed in an emergency department room.      DISCLAIMER: This note was prepared with [employer] voice recognition transcription software. Garbled syntax, mangled pronouns, and other bizarre constructions may be attributed to that software system.  "

## 2022-05-16 NOTE — Clinical Note
"Brandan Garciaon"Pamela was seen and treated in our emergency department on 5/16/2022.  He may return to work on 05/18/2022.       If you have any questions or concerns, please don't hesitate to call.      OLLIE CARPIO RN    "

## 2022-05-16 NOTE — DISCHARGE INSTRUCTIONS
Take medications as directed.  You may wear over the counter pain patches like Salonpas or Thermacare.  Apply heat for 20 minutes, then do gentle stretches.  Remove patch before applying heat.  See your primary care doctor for worsening symptoms.

## 2022-05-16 NOTE — Clinical Note
"Brandan"Antonina Santiago was seen and treated in our emergency department on 5/16/2022.  He may return to work on 05/17/2022.       If you have any questions or concerns, please don't hesitate to call.      Hollie Dueñas MD"

## 2022-05-25 NOTE — Clinical Note
Date of Service: 05/25/2022    PATIENT NAME:  Ankur Jameson    DATE OF SERVICE:  05/25/2022.    PERFORMING PROVIDER:  Erlin Loera MD.    PREOPERATIVE DIAGNOSIS:   Lumbar radiculopathy.    POSTOPERATIVE DIAGNOSIS:   Lumbar radiculopathy.    OPERATIVE PROCEDURE:   Left L4-L5 transforaminal epidural injection, CPT code 44672.   Left L5-S1 transforaminal epidural steroid injection, CPT code 00207.  Conscious sedation, CPT code 16792.    ANESTHESIA:   Conscious sedation plus local.    ESTIMATED BLOOD LOSS:  None.    INDICATIONS FOR THE PROCEDURE:   Mr. Jameson is a 26-year-old gentleman with back and left leg pain.  He has got lumbar disk herniation, wishes to proceed with an epidural injection.  Risks, benefits and alternatives were discussed; signed consent was obtained.    PROCEDURE NOTE:   The patient was positively identified in the holding area and given 1 gram of IV Ancef.  He was taken to the operating room and placed in the prone position.  He was considered appropriate for conscious sedation.  Moderate sedation was administered with continuous monitoring of the patient by a trained caregiver under my direct supervision, total monitored time exactly 20 minutes.  The back was prepped and draped in the usual sterile manner.  1% Lidocaine without Epinephrine or preservative was used to anesthetize the subcutaneous tissues.  25-gauge 3-1/2-inch spinal needles were inserted inferior and lateral to the left L4 and L5 pedicles.  These were inserted into the foramens, visualized in both the AP and lateral projections.  A small amount of radiopaque dye was injected for localization purposes.  No vascular uptake was noted.  4 mg of Dexamethasone and 1 mL of 1% Lidocaine without Epinephrine or preservative was injected at each level.  The needles were withdrawn.  The skin was cleansed, adhesive bandage applied.  The patient was allowed to ambulate.     He tolerated the procedure well without 
"Brandan Garciaboewn Santiago was seen and treated in our emergency department on 11/14/2020.  He may return to work on 11/16/2020.       If you have any questions or concerns, please don't hesitate to call.      Lizbeth Santiago, DO"
complication.      Dictated By: Erlin Loera MD  Signing Provider: Erlin Loera MD    PS/teresita ([id number])   DD: 05/25/2022 2:24:50 PM TD: 05/25/2022 2:44:08 PM      
none

## 2023-03-28 ENCOUNTER — PATIENT MESSAGE (OUTPATIENT)
Dept: RESEARCH | Facility: HOSPITAL | Age: 28
End: 2023-03-28
Payer: COMMERCIAL

## 2023-08-28 ENCOUNTER — TELEPHONE (OUTPATIENT)
Dept: OPHTHALMOLOGY | Facility: CLINIC | Age: 28
End: 2023-08-28
Payer: COMMERCIAL

## 2023-11-27 ENCOUNTER — HOSPITAL ENCOUNTER (EMERGENCY)
Facility: HOSPITAL | Age: 28
Discharge: HOME | End: 2023-11-27
Attending: EMERGENCY MEDICINE
Payer: COMMERCIAL

## 2023-11-27 VITALS
DIASTOLIC BLOOD PRESSURE: 98 MMHG | SYSTOLIC BLOOD PRESSURE: 157 MMHG | BODY MASS INDEX: 41.75 KG/M2 | WEIGHT: 315 LBS | HEART RATE: 61 BPM | HEIGHT: 73 IN | OXYGEN SATURATION: 96 % | RESPIRATION RATE: 18 BRPM | TEMPERATURE: 98 F

## 2023-11-27 DIAGNOSIS — G89.29 CHRONIC BILATERAL LOW BACK PAIN WITHOUT SCIATICA: Primary | ICD-10-CM

## 2023-11-27 DIAGNOSIS — M54.50 CHRONIC BILATERAL LOW BACK PAIN WITHOUT SCIATICA: Primary | ICD-10-CM

## 2023-11-27 PROCEDURE — 99284 EMERGENCY DEPT VISIT MOD MDM: CPT | Mod: ER

## 2023-11-27 PROCEDURE — 96372 THER/PROPH/DIAG INJ SC/IM: CPT | Performed by: PHYSICIAN ASSISTANT

## 2023-11-27 PROCEDURE — 63600175 PHARM REV CODE 636 W HCPCS: Mod: ER | Performed by: PHYSICIAN ASSISTANT

## 2023-11-27 RX ORDER — KETOROLAC TROMETHAMINE 30 MG/ML
30 INJECTION, SOLUTION INTRAMUSCULAR; INTRAVENOUS
Status: COMPLETED | OUTPATIENT
Start: 2023-11-27 | End: 2023-11-27

## 2023-11-27 RX ORDER — IBUPROFEN 600 MG/1
600 TABLET ORAL EVERY 6 HOURS PRN
Qty: 20 TABLET | Refills: 0 | Status: SHIPPED | OUTPATIENT
Start: 2023-11-27

## 2023-11-27 RX ADMIN — KETOROLAC TROMETHAMINE 30 MG: 30 INJECTION, SOLUTION INTRAMUSCULAR at 10:11

## 2023-11-27 NOTE — ED PROVIDER NOTES
"Encounter Date: 11/27/2023    SCRIBE #1 NOTE: I, Vida Hayward, am scribing for, and in the presence of,  Roseanne Briones PA-C. I have scribed the following portions of the note - Other sections scribed: HPI, ROS, PE.       History     Chief Complaint   Patient presents with    Back Pain     Lower back pain, intermittent "for years", this episode since satruday, denies recent injury or fall     Brandan Santiago is a 28 y.o. male with no pertinent PMHx who presents to the ED with acute on chronic intermittent bilateral low back pain x3 days. Reports pain is exacerbated with standing from a seated position and with quick movements. Patient reports pain has been intermittent for 10-15 years, he suspects from an injury playing basketball. Denies recent fall, trauma or injury. Reports he does a lot of heavy lifting at work. Endorses seeing PCP regularly. Denies medical problems. NKDA. No other exacerbating or alleviating factors. Denies nausea, vomiting, diarrhea, CP, SOB or other associated symptoms.       The history is provided by the patient. No  was used.     Review of patient's allergies indicates:  No Known Allergies  No past medical history on file.  No past surgical history on file.  Family History   Problem Relation Age of Onset    Hypertension Mother      Social History     Tobacco Use    Smoking status: Never    Smokeless tobacco: Never   Substance Use Topics    Alcohol use: Yes     Comment: 3 x a week    Drug use: No     Review of Systems   Constitutional:  Negative for chills, fatigue and fever.   HENT:  Negative for congestion, sinus pressure, sinus pain, sneezing and sore throat.    Eyes:  Negative for visual disturbance.   Respiratory:  Negative for cough and shortness of breath.    Cardiovascular:  Negative for chest pain.   Gastrointestinal:  Negative for abdominal pain, diarrhea, nausea and vomiting.   Genitourinary:  Negative for difficulty urinating.   Musculoskeletal:  Positive " for back pain.   Skin:  Negative for rash.   Neurological:  Negative for syncope and headaches.       Physical Exam     Initial Vitals [11/27/23 0920]   BP Pulse Resp Temp SpO2   (!) 157/98 61 18 98 °F (36.7 °C) 96 %      MAP       --         Physical Exam    Nursing note and vitals reviewed.  Constitutional: He appears well-developed and well-nourished. He is not diaphoretic. No distress.   HENT:   Head: Normocephalic and atraumatic.   Right Ear: External ear normal.   Left Ear: External ear normal.   Cardiovascular:  Normal rate and regular rhythm.           Pulmonary/Chest: Breath sounds normal. No respiratory distress.   Abdominal: He exhibits no distension.   Musculoskeletal:         General: No edema.      Lumbar back: Negative right straight leg raise test and negative left straight leg raise test.      Comments: No spinal tenderness. No muscular tenderness. Normal ROM.     Neurological: He is alert and oriented to person, place, and time. GCS score is 15. GCS eye subscore is 4. GCS verbal subscore is 5. GCS motor subscore is 6.   Skin: Skin is warm and dry.   Psychiatric: He has a normal mood and affect. His behavior is normal. Judgment normal.         ED Course   Procedures  Labs Reviewed - No data to display       Imaging Results    None          Medications   ketorolac injection 30 mg (30 mg Intramuscular Given 11/27/23 1035)     Medical Decision Making  Brandan Santiago is a 28 y.o. male with no pertinent PMHx who presents to the ED with acute on chronic intermittent bilateral low back pain x3 days. Reports pain is exacerbated with standing from a seated position and with quick movements. Patient reports pain has been intermittent for 10-15 years, he suspects from an injury playing basketball. Denies recent fall, trauma or injury. Reports he does a lot of heavy lifting at work. Endorses seeing PCP regularly. Denies medical problems. NKDA. No other exacerbating or alleviating factors. Denies nausea,  vomiting, diarrhea, CP, SOB or other associated symptoms. On physical exam pt does not have any midline spinal tenderness, muscular tenderness or muscle spasms to his thoracic or lumbar regions.  Negative straight leg raise bilaterally.  Normal range of motion.  Normal gait.  Remainder of physical exam was unremarkable.  He was hypertensive at 157/98 with otherwise reassuring vital signs on arrival to the ED today.  We discussed that he will likely benefit from referral to physical therapy and possible referral to back specialist for more in-depth imaging such as CT or MRI to further investigate the root cause of his pain.  I recommend he follow up with his primary care doctor as soon as possible to further discuss this.  For now, we will treat with Toradol in the ER and discharged with NSAIDs and referral to physical therapy.  Return precautions discussed.    Of note: Ddx to include but not limited to:  Sciatica, herniated disc, muscle spasm, muscle strain, chronic back pain      Risk  Prescription drug management.            Scribe Attestation:   Scribe #1: I performed the above scribed service and the documentation accurately describes the services I performed. I attest to the accuracy of the note.                         I, Roseanne Briones, personally performed the services described in this documentation.  All medical record entries made by the scribe were at my direction and in my presence.  I have reviewed the chart and agree that the record reflects my personal performance and is accurate and complete.        Clinical Impression:  Final diagnoses:  [M54.50, G89.29] Chronic bilateral low back pain without sciatica (Primary)          ED Disposition Condition    Discharge Stable          ED Prescriptions       Medication Sig Dispense Start Date End Date Auth. Provider    ibuprofen (ADVIL,MOTRIN) 600 MG tablet Take 1 tablet (600 mg total) by mouth every 6 (six) hours as needed for Pain. 20 tablet 11/27/2023 --  Roseanne Briones PA-C          Follow-up Information       Follow up With Specialties Details Why Contact Info    [facility] ED Emergency Medicine Go to  As needed, If symptoms worsen [address] [facility] [address] [phone]             Roseanne Briones PA-C  11/27/23 1955

## 2023-11-27 NOTE — Clinical Note
"Brandan"Antonina Santiago was seen and treated in our emergency department on 11/27/2023.  He may return to work on 11/28/2023.  Pt was also sick on 11/26/2023.     If you have any questions or concerns, please don't hesitate to call.      Roseanne Briones PA-C"

## 2023-11-27 NOTE — DISCHARGE INSTRUCTIONS
Please take the prescribed medications according to the directions on the bottle.  I have placed a referral to physical therapy.  They will contact you to schedule an appointment for an initial evaluation.  If your symptoms worsen you may return to the ED for reevaluation. Otherwise, please follow up with your primary care doctor as soon as you can to further discuss your chronic back pain.

## 2023-11-30 NOTE — PROGRESS NOTES
"[facility]   Physical Therapy Initial Evaluation      Name: Brandan Santiago  Clinic Number: [id number]    Therapy Diagnosis: No diagnosis found.     Physician: Roseanne Briones PA-C    Physician Orders: PT Eval and Treat   Medical Diagnosis from Referral: M54.50,G89.29 (ICD-10-CM) - Chronic bilateral low back pain without sciatica   Evaluation Date: 12/1/2023  Authorization Period Expiration: 11/26/24  Plan of Care Expiration: ***  Progress Note Due: 1/1/24  Date of Surgery: NA  Visit # / Visits authorized: 1/ 1   FOTO: 1/ 3    Precautions: Standard     Time In: ***  Time Out: ***  Total Billable Time: *** minutes    Subjective     Date of onset: ***    History of current condition - Brandan reports: ***    Falls: ***    Imaging: none:     Prior Therapy: ***  Social History: *** {LIVES WITH:79736}  Occupation: ***  Prior Level of Function: ***  Current Level of Function: ***    Pain:  Current {0-10:41700::"0"}/10, worst {0-10:54209::"0"}/10, best {0-10:85223::"0"}/10   Location:  back - lumbar  Description: {Pain Description:42911}  Aggravating Factors: {Causes; Pain:65442}  Easing Factors: {Pain (activities that relieve):41628}    Patients goals: ***     Medical History:   No past medical history on file.    Surgical History:   Brandan Santiago  has no past surgical history on file.    Medications:   Brandan has a current medication list which includes the following prescription(s): acetaminophen, amoxicillin-clavulanate 875-125mg, cetirizine, famotidine, ibuprofen, meloxicam, and ondansetron.    Allergies:   Review of patient's allergies indicates:  No Known Allergies     Objective      Posture Alignment: {AMB PT VESTIBULAR POSTURE ALIGNMENT:26514}  Palpation: ***    LUMBAR SPINE AROM:   Flexion: ***   Extension: ***   Left Sidebend: ***   Right Sidebend: ***   Left Rotation: ***   Right Rotation: ***     SEGMENTAL MOBILITY: ***      LOWER EXTREMITY STRENGTH:   Left Right   Quadriceps {AMB " PT VESTIBULAR STRENGTH:25909} {AMB PT VESTIBULAR STRENGTH:50912}   Hamstrings {AMB PT VESTIBULAR STRENGTH:14539} {AMB PT VESTIBULAR STRENGTH:07066}     Iliopsoas {AMB PT VESTIBULAR STRENGTH:82856} {AMB PT VESTIBULAR STRENGTH:98534}   Glute Med {AMB PT VESTIBULAR STRENGTH:57285} {AMB PT VESTIBULAR STRENGTH:85884}   Hip IR {AMB PT VESTIBULAR STRENGTH:48000} {AMB PT VESTIBULAR STRENGTH:98231}   Hip ER {AMB PT VESTIBULAR STRENGTH:80451} {AMB PT VESTIBULAR STRENGTH:36153}   Hip Ext {AMB PT VESTIBULAR STRENGTH:08103} {AMB PT VESTIBULAR STRENGTH:33664}   Ankle DF {AMB PT VESTIBULAR STRENGTH:59639} {AMB PT VESTIBULAR STRENGTH:34228}   Ankle PF {AMB PT VESTIBULAR STRENGTH:90027} {AMB PT VESTIBULAR STRENGTH:16402}       DTR's:   Left Right   Patella Tendon  (L2-4) {AMB PT KNEE DTR'S:71798} {AMB PT KNEE DTR'S:62277}   Achilles Tendon  (S1-2) {AMB PT KNEE DTR'S:10943} {AMB PT KNEE DTR'S:84660}     Dermatomes: Sensation: Light Touch: {INTACT:04793}  Saddle Sensation: ***  Myotomes:    Left  Right    Hip flexion (L2,3) *** ***   Knee extension (L2,3,4) *** ***   Ankle dorsiflexion (L4,5) *** ***   Great Toe extension (L5) *** ***   Ankle plantarflexion (S1) *** ***   Knee Flexion (L5,S1,S2) *** ***     Flexibility:***    Special Tests:   Left Right   Slump *** ***   SLR *** ***   Crossed SLR *** ***   Sacral Thrust *** ***   KYLAH *** ***   Quadrant Test *** ***   Prone Instability Test *** ***     GAIT: Mackenzie ambulates with {AMB PT VESTIBULAR ASSISTIVE:12784} with {AMB PT VESTIBULAR SUPERVISION:31351}.     GAIT DEVIATIONS: Mackenzie displays {AMB PT VESTIBULAR GAIT DEVIATIONS:41969}    Pt/family was provided educational information, including: role of PT, goals for PT, scheduling - pt verbalized understanding. Discussed insurance limitations with pt.       FOTO Lumbar Survey    Therapist reviewed FOTO scores for Brandan Santiago on 12/1/2023.   FOTO documents entered into [employer] - see Media section.    Intake Score: ***%  ALLIE: ***%    "   Minimal disability: 0-20%      Moderate disability: 21-40%      Severe disability: 41-60%      Crippled: 61-80%      Bed- Ridden: 81%-100     Treatment     Total Treatment time (time-based codes) separate from Evaluation: *** minutes     Brandan received the treatments listed below:      therapeutic exercises to develop {AMB PT PROGRESS OBJECTIVE:60014} for *** minutes including:  ***    manual therapy techniques: {AMB PT PROGRESS MANUAL THERAPY:64612} were applied to the: *** for *** minutes, including:  ***    neuromuscular re-education activities to improve: {AMB PT PROGRESS NEURO RE-ED:56328} for *** minutes. The following activities were included:  ***    therapeutic activities to improve functional performance for ***  minutes, including:  ***    gait training to improve functional mobility and safety for ***  minutes, including:  ***    direct contact modalities after being cleared for contraindications: {AMB PT PROGRESS DIRECT CONTACT MODES:11146}    supervised modalities after being cleared for contradictions: {AMB PT SUPERVISED MODES:07664}    hot pack for *** minutes to ***.    cold pack for *** minutes to ***.    Patient Education and Home Exercises     Education provided:   - ***    Written Home Exercises Provided: {Blank single:05111::"yes","Patient instructed to cont prior HEP"}. Exercises were reviewed and Brandan was able to demonstrate them prior to the end of the session.  Brandan demonstrated {Desc; good/fair/poor:48903} understanding of the education provided. See EMR under Patient Instructions for exercises provided during therapy sessions.    Assessment     Brandan is a 28 y.o. male referred to outpatient Physical Therapy with a medical diagnosis of Chronic bilateral low back pain without sciatica . Patient presents with ***    Patient prognosis is {REHAB PROGNOSIS OHS:26272}.   Patient will benefit from skilled outpatient Physical Therapy to address the deficits stated above and in the chart " "below, provide patient /family education, and to maximize patientt's level of independence.     Plan of care discussed with patient: {YES:88992}  Patient's spiritual, cultural and educational needs considered and patient is agreeable to the plan of care and goals as stated below:     Anticipated Barriers for therapy: ***    Medical Necessity is demonstrated by the following  History  Co-morbidities and personal factors that may impact the plan of care [] LOW: no personal factors / co-morbidities  [] MODERATE: 1-2 personal factors / co-morbidities  [] HIGH: 3+ personal factors / co-morbidities    Moderate / High Support Documentation:   Co-morbidities affecting plan of care: ***    Personal Factors:   {Personal Factors:53938}     Examination  Body Structures and Functions, activity limitations and participation restrictions that may impact the plan of care [] LOW: addressing 1-2 elements  [] MODERATE: 3+ elements  [] HIGH: 4+ elements (please support below)    Moderate / High Support Documentation: ***     Clinical Presentation [] LOW: stable  [] MODERATE: Evolving  [] HIGH: Unstable     Decision Making/ Complexity Score: {Desc; low/moderate/high:242951}       Goals:  Short Term Goals: *** weeks   ***    Long Term Goals: *** weeks   ***  Plan     Plan of care Certification: 12/1/2023 to ***.    Outpatient Physical Therapy {NUMBERS 1-5:16881} times weekly for {0-10:87772::"0"} weeks to include the following interventions: {[place] PLAN:71129}.     Beatrice Saha, PT,DPT  11/30/2023          Physician's Signature: _________________________________________ Date: ________________    "

## 2023-12-01 ENCOUNTER — CLINICAL SUPPORT (OUTPATIENT)
Dept: REHABILITATION | Facility: HOSPITAL | Age: 28
End: 2023-12-01
Payer: COMMERCIAL

## 2023-12-01 DIAGNOSIS — M54.50 CHRONIC BILATERAL LOW BACK PAIN WITHOUT SCIATICA: ICD-10-CM

## 2023-12-01 DIAGNOSIS — G89.29 CHRONIC BILATERAL LOW BACK PAIN WITHOUT SCIATICA: ICD-10-CM

## 2023-12-01 DIAGNOSIS — R29.3 POSTURE IMBALANCE: Primary | ICD-10-CM

## 2023-12-01 PROCEDURE — 97161 PT EVAL LOW COMPLEX 20 MIN: CPT | Mod: PN

## 2023-12-01 NOTE — PLAN OF CARE
[facility]   Physical Therapy Initial Evaluation      Name: Brandan Santiago  [facility] Number: [id number]    Therapy Diagnosis:   Encounter Diagnosis   Name Primary?    Chronic bilateral low back pain without sciatica         Physician: Roseanne Briones PA-C    Physician Orders: PT Eval and Treat   Medical Diagnosis from Referral: M54.50,G89.29 (ICD-10-CM) - Chronic bilateral low back pain without sciatica   Evaluation Date: 12/1/2023  Authorization Period Expiration: 11/26/24  Plan of Care Expiration: 1/1/24  Progress Note Due: 1/1/24  Date of Surgery: NA  Visit # / Visits authorized: 1/ 1   FOTO: 1/ 3    Precautions: Standard     Time In: 0700a  Time Out: 0728a  Total Billable Time: 28 minutes    Subjective     Date of onset: years    History of current condition - Brandan reports:  He thinks that he hurt his back a long time ago in high school playing basketball and it just flares up when it wants to. He will go to the ED they will give him a steroid injection and then he will be good for years. It will be triggered from how he is sitting in his lower area. When it first flares up he has to lay down but after that he is good. He works for [employer] and it is an active job. He believes that he has good lifting techniques. He works out a little bit but he does not do too much at home. He went earlier this week to the ED and they gave him an injection for his back. 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    Surgical History:   Brandan Santiago  has no past surgical history on file.    Medications:   Brandan has a current medication list which includes the following prescription(s): acetaminophen, amoxicillin-clavulanate 875-125mg, cetirizine, famotidine, ibuprofen, meloxicam, and ondansetron.    Allergies:   Review of patient's allergies indicates:  No Known Allergies     Objective      Posture Alignment: slouched posture  Palpation: no tenderness to palpation    LUMBAR SPINE AROM:   Flexion: WFL   Extension: WFL   Left Sidebend: WFL   Right Sidebend: WFL   Left Rotation: WFL   Right Rotation: WFL     SEGMENTAL MOBILITY: slight decrease in lumbar mobility with  in prone      LOWER EXTREMITY STRENGTH:   Left Right   Quadriceps 5/5 5/5   Hamstrings 5/5 5/5     Iliopsoas 5/5 5/5   Glute Med 5/5 5/5   Hip IR 5/5 5/5   Hip ER 5/5 5/5   Hip Ext 5/5 5/5   Ankle DF NT NT   Ankle PF NT NT       DTR's:NT    Dermatomes: Sensation: Light Touch: Intact  Saddle Sensation: intact      Flexibility:WFL    Special Tests:   Left Right   Slump NT NT   SLR - -   Crossed SLR - -   Sacral Thrust - -   KYLAH - -   Quadrant Test - -   Prone Instability Test NT NT     Functional squat: no pain with early heel rise  Functional lift: 16lb box appropriate lifting techniques without pain  GAIT: Mackenzie ambulates with no assistive device with independently.     GAIT DEVIATIONS: Mackenzie displays no obvious gait deficits    Pt/family was provided educational information, including: role of PT, goals for PT, scheduling - pt verbalized understanding. Discussed insurance limitations with pt.       FOTO Lumbar Survey    Therapist reviewed FOTO scores for Brandan Santiago on 12/1/2023.   FOTO documents entered into [employer] - see Media section.    Intake Score: 51%  ALLIE: 32%      Moderate disability: 21-40%           Treatment     Total Treatment time (time-based codes) separate from Evaluation: 00 minutes      Brandan received the treatments listed below:        Patient Education and Home Exercises     Education provided:   - Plan of Care   - prolonged use of steroids    Written Home Exercises Provided: NA.     Assessment     Brandan is a 28 y.o. male referred to outpatient Physical Therapy with a medical diagnosis of Chronic bilateral low back pain without sciatica . Patient presents without symptoms and PT unable to reproduce symptoms with special tests. Patient did not display range of motion or strength deficits and displayed good lifting mechanics. PT to leave episode open for 1 month for if patient begins to have pain after steroid wears off. Patient agreeable to plan.     Patient prognosis is Good.   Patient will benefit from skilled outpatient Physical Therapy to address the deficits stated above and in the chart below, provide patient /family education, and to maximize patientt's level of independence.     Plan of care discussed with patient: Yes  Patient's spiritual, cultural and educational needs considered and patient is agreeable to the plan of care and goals as stated below:     Anticipated Barriers for therapy: none    Medical Necessity is demonstrated by the following  History  Co-morbidities and personal factors that may impact the plan of care [] LOW: no personal factors / co-morbidities  [x] MODERATE: 1-2 personal factors / co-morbidities  [] HIGH: 3+ personal factors / co-morbidities    Moderate / High Support Documentation:   Co-morbidities affecting plan of care: BMI    Personal Factors:   age  social background  character     Examination  Body Structures and Functions, activity limitations and participation restrictions that may impact the plan of care [x] LOW: addressing 1-2 elements  [] MODERATE: 3+ elements  [] HIGH: 4+ elements (please support below)    Moderate / High Support Documentation:      Clinical Presentation [x] LOW: stable  [] MODERATE: Evolving  [] HIGH: Unstable     Decision  Making/ Complexity Score: low       Goals:  Short Term Goals: 4 weeks   Patient will be independent with gym routine to maintain and prevent further back injury      Plan     Plan of care Certification: 12/1/2023 to 1/1/24.    Outpatient Physical Therapy 1 times weekly for 4 weeks to include the following interventions: Manual Therapy, Moist Heat/ Ice, Neuromuscular Re-ed, Patient Education, Therapeutic Activities, and Therapeutic Exercise.     Beatrice Saha PT,DPT  12/01/2023          Physician's Signature: _________________________________________ Date: ________________

## 2024-11-04 ENCOUNTER — PATIENT MESSAGE (OUTPATIENT)
Dept: RESEARCH | Facility: HOSPITAL | Age: 29
End: 2024-11-04
Payer: COMMERCIAL

## 2025-03-08 ENCOUNTER — HOSPITAL ENCOUNTER (EMERGENCY)
Facility: HOSPITAL | Age: 30
Discharge: HOME | End: 2025-03-08
Attending: EMERGENCY MEDICINE
Payer: COMMERCIAL

## 2025-03-08 VITALS
RESPIRATION RATE: 20 BRPM | BODY MASS INDEX: 41.75 KG/M2 | HEIGHT: 73 IN | DIASTOLIC BLOOD PRESSURE: 80 MMHG | OXYGEN SATURATION: 98 % | HEART RATE: 93 BPM | SYSTOLIC BLOOD PRESSURE: 132 MMHG | WEIGHT: 315 LBS | TEMPERATURE: 98 F

## 2025-03-08 DIAGNOSIS — J10.1 INFLUENZA A: Primary | ICD-10-CM

## 2025-03-08 LAB
CTP QC/QA: YES
INFLUENZA A ANTIGEN, POC: POSITIVE
INFLUENZA B ANTIGEN, POC: NEGATIVE
POC RAPID STREP A: NEGATIVE
SARS-COV-2 RDRP RESP QL NAA+PROBE: NEGATIVE

## 2025-03-08 PROCEDURE — 87880 STREP A ASSAY W/OPTIC: CPT | Mod: ER

## 2025-03-08 PROCEDURE — 87804 INFLUENZA ASSAY W/OPTIC: CPT | Mod: 59,ER

## 2025-03-08 PROCEDURE — 99284 EMERGENCY DEPT VISIT MOD MDM: CPT | Mod: ER

## 2025-03-08 PROCEDURE — 87635 SARS-COV-2 COVID-19 AMP PRB: CPT | Mod: ER | Performed by: EMERGENCY MEDICINE

## 2025-03-08 RX ORDER — BENZONATATE 100 MG/1
100 CAPSULE ORAL 3 TIMES DAILY PRN
Qty: 30 CAPSULE | Refills: 0 | Status: SHIPPED | OUTPATIENT
Start: 2025-03-08 | End: 2025-03-18

## 2025-03-08 RX ORDER — PHENOL 1.4 %
AEROSOL, SPRAY (ML) MUCOUS MEMBRANE
Qty: 177 ML | Refills: 0 | Status: SHIPPED | OUTPATIENT
Start: 2025-03-08

## 2025-03-08 RX ORDER — CETIRIZINE HYDROCHLORIDE 10 MG/1
10 TABLET ORAL DAILY
Qty: 30 TABLET | Refills: 0 | Status: SHIPPED | OUTPATIENT
Start: 2025-03-08 | End: 2026-03-08

## 2025-03-08 RX ORDER — IBUPROFEN 600 MG/1
600 TABLET ORAL EVERY 6 HOURS PRN
Qty: 30 TABLET | Refills: 0 | Status: SHIPPED | OUTPATIENT
Start: 2025-03-08

## 2025-03-08 RX ORDER — ACETAMINOPHEN 500 MG
500 TABLET ORAL EVERY 4 HOURS PRN
Qty: 30 TABLET | Refills: 0 | Status: SHIPPED | OUTPATIENT
Start: 2025-03-08

## 2025-03-08 RX ORDER — FLUTICASONE PROPIONATE 50 MCG
1 SPRAY, SUSPENSION (ML) NASAL 2 TIMES DAILY PRN
Qty: 15 G | Refills: 0 | Status: SHIPPED | OUTPATIENT
Start: 2025-03-08

## 2025-03-08 RX ORDER — GUAIFENESIN 100 MG/5ML
200 LIQUID ORAL EVERY 4 HOURS PRN
Qty: 118 ML | Refills: 0 | Status: SHIPPED | OUTPATIENT
Start: 2025-03-08 | End: 2025-03-18

## 2025-03-08 RX ORDER — PHENOL 1.4 %
AEROSOL, SPRAY (ML) MUCOUS MEMBRANE
Qty: 177 ML | Refills: 0 | Status: SHIPPED | OUTPATIENT
Start: 2025-03-08 | End: 2025-03-08

## 2025-03-08 NOTE — Clinical Note
"Brandan"Antonina Santiago was seen and treated in our emergency department on 3/8/2025.  He may return to work on 03/10/2025.       If you have any questions or concerns, please don't hesitate to call.      Dc Carter PA-C"

## 2025-03-08 NOTE — ED PROVIDER NOTES
Encounter Date: 3/8/2025       History     Chief Complaint   Patient presents with    URI     Pt presents w/ a c/o of URI sx (headache, cough, sore throat, and congestion) since Wednesday.     29-year-old male with no past medical history presents to ED for emergent evaluation of generalized myalgias, rhinorrhea, nasal congestion, sore throat, nonproductive cough that started 3 days ago.  He attempted Mucinex and NyQuil with no relief.  He denies any sick contacts.  He denies any fever, dysphagia, chest pain, shortness of breath, abdominal pain, nausea, vomiting, diarrhea, dysuria, hematuria.  No other symptoms reported.    The history is provided by the patient. No  was used.     Review of patient's allergies indicates:  No Known Allergies  History reviewed. No pertinent past medical history.  History reviewed. No pertinent surgical history.  Family History   Problem Relation Name Age of Onset    Hypertension Mother       Social History[1]  Review of Systems   Constitutional:  Negative for chills and fever.   HENT:  Positive for congestion, rhinorrhea and sore throat. Negative for ear pain and trouble swallowing.    Eyes:  Negative for redness.   Respiratory:  Positive for cough. Negative for shortness of breath.    Cardiovascular:  Negative for chest pain.   Gastrointestinal:  Negative for abdominal pain, diarrhea, nausea and vomiting.   Genitourinary:  Negative for decreased urine volume, difficulty urinating, dysuria, frequency, hematuria and urgency.   Musculoskeletal:  Positive for myalgias. Negative for back pain and neck pain.   Skin:  Negative for rash.   Neurological:  Negative for headaches.   Psychiatric/Behavioral:  Negative for confusion.        Physical Exam     Initial Vitals [03/08/25 1633]   BP Pulse Resp Temp SpO2   132/80 93 20 98.2 °F (36.8 °C) 98 %      MAP       --         Physical Exam    Nursing note and vitals reviewed.  Constitutional: He appears well-developed and  well-nourished. He is not diaphoretic.  Non-toxic appearance. No distress.   HENT:   Head: Normocephalic and atraumatic.   Right Ear: Hearing, tympanic membrane, external ear and ear canal normal. Tympanic membrane is not perforated, not erythematous and not bulging.   Left Ear: Hearing, tympanic membrane, external ear and ear canal normal. Tympanic membrane is not perforated, not erythematous and not bulging.   Nose: Nose normal. Mouth/Throat: Uvula is midline and oropharynx is clear and moist.   Full range motion of jaw.  No trismus.  Airway is patent.  Speaking in full sentences.  No hot potato voice.  Tolerating secretions.     Eyes: Conjunctivae and EOM are normal.   Neck: Neck supple.   Normal range of motion.   Full passive range of motion without pain.     Cardiovascular:            Pulses:       Radial pulses are 2+ on the right side and 2+ on the left side.   Pulmonary/Chest: Effort normal and breath sounds normal. No respiratory distress. He has no decreased breath sounds.   Abdominal: Abdomen is soft and flat. There is no abdominal tenderness.   No right CVA tenderness.  No left CVA tenderness. There is no rebound and no guarding.   Musculoskeletal:      Cervical back: Full passive range of motion without pain, normal range of motion and neck supple. No rigidity.     Neurological: He is alert. No cranial nerve deficit.   Neuro intact.  Strength and sensation intact to bilateral upper and lower extremities.   Skin: Skin is warm and dry. No rash noted.         ED Course   Procedures  Labs Reviewed   POCT RAPID INFLUENZA A/B - Abnormal       Result Value    Influenza B Ag negative      Inflenza A Ag positive (*)    SARS-COV-2 RDRP GENE    POC Rapid COVID Negative       Acceptable Yes      Narrative:     This test utilizes isothermal nucleic acid amplification technology to detect the SARS-CoV-2 RdRp nucleic acid segment. The analytical sensitivity (limit of detection) is 500 copies/swab.      A POSITIVE result is indicative of the presence of SARS-CoV-2 RNA; clinical correlation with patient history and other diagnostic information is necessary to determine patient infection status.    A NEGATIVE result means that SARS-CoV-2 nucleic acids are not present above the limit of detection. A NEGATIVE result should be treated as presumptive. It does not rule out the possibility of COVID-19 and should not be the sole basis for treatment decisions. If COVID-19 is strongly suspected based on clinical and exposure history, re-testing using an alternate molecular assay should be considered.     Commercial kits are provided by [employer].       POCT INFLUENZA A/B MOLECULAR   POCT STREP A MOLECULAR   POCT STREP A, RAPID    POC Rapid Strep A negative            Imaging Results    None          Medications - No data to display  Medical Decision Making  This is a 29-year-old male with no past medical history presents to ED for emergent evaluation of generalized myalgias, rhinorrhea, nasal congestion, sore throat, nonproductive cough that started 3 days ago.  He attempted Mucinex and NyQuil with no relief.  He denies any sick contacts.  He denies any fever, dysphagia, chest pain, shortness of breath, abdominal pain, nausea, vomiting, diarrhea, dysuria, hematuria.  No other symptoms reported.    On physical exam, patient is well-appearing and in no acute distress.  Nontoxic appearing.  Lungs are clear to auscultation bilaterally.  Abdomen is soft and nontender.  No guarding, rigidity, rebound.  2+ radial pulses bilaterally.  Posterior oropharynx is not erythematous.  No edema or exudate.  Uvula midline.  Bilateral tympanic membrane is normal.  No erythema, bulging, or perforations.  Neuro intact.  Strength and sensation intact bilateral upper and lower extremities.  Full range motion of jaw.  No trismus.  Airway is patent.  Speaking in full sentences.  No hot potato voice.  Tolerating secretions.  Full range  motion of neck.  No neck rigidity.  Strep and COVID negative.  Flu A positive.  will discharge patient on Tylenol, ibuprofen, Chloraseptic throat spray, Cepacol lozenges, Zyrtec, Flonase, Robitussin, Tessalon Perles.  Urged prompt follow up with PCP for further evaluation.    Strict return precautions given. I discussed with the patient/family the diagnosis, treatment plan, indications for return to the emergency department, and for expected follow-up. The patient/family verbalized an understanding. The patient/family is asked if there are any questions or concerns. We discuss the case, until all issues are addressed to the patient/family's satisfaction. Patient/family understands and is agreeable to the plan. Patient is stable and ready for discharge.      Amount and/or Complexity of Data Reviewed  Labs: ordered.    Risk  OTC drugs.  Prescription drug management.                                      Clinical Impression:  Final diagnoses:  [J10.1] Influenza A (Primary)          ED Disposition Condition    Discharge Stable          ED Prescriptions       Medication Sig Dispense Start Date End Date Auth. Provider    acetaminophen (TYLENOL) 500 MG tablet Take 1 tablet (500 mg total) by mouth every 4 (four) hours as needed for Pain or Temperature greater than (100.5 or greater). 30 tablet 3/8/2025 -- Dc Carter PA-C    ibuprofen (ADVIL,MOTRIN) 600 MG tablet Take 1 tablet (600 mg total) by mouth every 6 (six) hours as needed for Pain or Temperature greater than (100.5 or greater). 30 tablet 3/8/2025 -- Dc Carter PA-C    cetirizine (ZYRTEC) 10 MG tablet Take 1 tablet (10 mg total) by mouth once daily. 30 tablet 3/8/2025 3/8/2026 Dc Carter PA-C    fluticasone propionate (FLONASE) 50 mcg/actuation nasal spray 1 spray (50 mcg total) by Each Nostril route 2 (two) times daily as needed for Rhinitis. 15 g 3/8/2025 -- Dc Carter PA-C    phenoL (CHLORASEPTIC THROAT SPRAY) 1.4 % SprA  (Status:  Discontinued) by Mucous Membrane route every 2 (two) hours. 177 mL 3/8/2025 3/8/2025 Dc Carter PA-C    benzocaine-menthoL 15-3.6 mg Lozg  (Status: Discontinued) 1 lozenge by Mucous Membrane route every 2 (two) hours as needed (sore throat). 16 lozenge 3/8/2025 3/8/2025 Dc Carter PA-C    guaiFENesin 100 mg/5 ml (ROBITUSSIN) 100 mg/5 mL syrup Take 10 mLs (200 mg total) by mouth every 4 (four) hours as needed for Cough. 118 mL 3/8/2025 3/18/2025 Dc Carter PA-C    benzonatate (TESSALON) 100 MG capsule Take 1 capsule (100 mg total) by mouth 3 (three) times daily as needed for Cough. 30 capsule 3/8/2025 3/18/2025 Dc Carter PA-C    benzocaine-menthoL 15-3.6 mg Lozg 1 lozenge by Mucous Membrane route every 2 (two) hours as needed (sore throat). 16 lozenge 3/8/2025 -- Dc Carter PA-C    phenoL (CHLORASEPTIC THROAT SPRAY) 1.4 % SprA by Mucous Membrane route every 2 (two) hours. 177 mL 3/8/2025 -- Dc Carter PA-C          Follow-up Information       Follow up With Specialties Details Why Contact [facility]    [facility] -  Schedule an appointment as soon as possible for a visit in 2 days for further evaluation [address] [place] [address]  [phone]      [facility] ED Emergency Medicine In 2 days If symptoms worsen [address] [facility] [address] [phone]                 [1]   Social History  Tobacco Use    Smoking status: Never    Smokeless tobacco: Never   Substance Use Topics    Alcohol use: Yes     Comment: 3 x a week    Drug use: No        Dc Carter PA-C  03/08/25 [id number]

## 2025-03-08 NOTE — DISCHARGE INSTRUCTIONS

## 2025-03-29 ENCOUNTER — HOSPITAL ENCOUNTER (EMERGENCY)
Facility: HOSPITAL | Age: 30
Discharge: HOME | End: 2025-03-29
Attending: EMERGENCY MEDICINE
Payer: COMMERCIAL

## 2025-03-29 VITALS
SYSTOLIC BLOOD PRESSURE: 147 MMHG | DIASTOLIC BLOOD PRESSURE: 90 MMHG | RESPIRATION RATE: 17 BRPM | WEIGHT: 315 LBS | BODY MASS INDEX: 41.75 KG/M2 | HEIGHT: 73 IN | HEART RATE: 78 BPM | TEMPERATURE: 99 F | OXYGEN SATURATION: 98 %
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
## 2025-03-29 DIAGNOSIS — M79.18 MUSCULOSKELETAL PAIN: ICD-10-CM

## 2025-03-29 PROCEDURE — 25000003 PHARM REV CODE 250: Mod: ER | Performed by: EMERGENCY MEDICINE

## 2025-03-29 PROCEDURE — 99283 EMERGENCY DEPT VISIT LOW MDM: CPT | Mod: ER

## 2025-03-29 RX ORDER — KETOROLAC TROMETHAMINE 10 MG/1
10 TABLET, FILM COATED ORAL
Status: COMPLETED | OUTPATIENT
Start: 2025-03-29 | End: 2025-03-29

## 2025-03-29 RX ORDER — METHOCARBAMOL 500 MG/1
1000 TABLET, FILM COATED ORAL 3 TIMES DAILY
Qty: 30 TABLET | Refills: 0 | Status: SHIPPED | OUTPATIENT
Start: 2025-03-29 | End: 2025-04-03

## 2025-03-29 RX ORDER — FLUTICASONE PROPIONATE 50 MCG
1 SPRAY, SUSPENSION (ML) NASAL 2 TIMES DAILY PRN
Qty: 15 G | Refills: 0 | Status: SHIPPED | OUTPATIENT
Start: 2025-03-29

## 2025-03-29 RX ORDER — LEVOCETIRIZINE DIHYDROCHLORIDE 5 MG/1
5 TABLET, FILM COATED ORAL NIGHTLY
Qty: 30 TABLET | Refills: 0 | Status: SHIPPED | OUTPATIENT
Start: 2025-03-29 | End: 2026-03-29

## 2025-03-29 RX ORDER — METHOCARBAMOL 500 MG/1
1000 TABLET, FILM COATED ORAL
Status: COMPLETED | OUTPATIENT
Start: 2025-03-29 | End: 2025-03-29

## 2025-03-29 RX ORDER — IBUPROFEN 600 MG/1
600 TABLET ORAL EVERY 6 HOURS PRN
Qty: 20 TABLET | Refills: 0 | Status: SHIPPED | OUTPATIENT
Start: 2025-03-29

## 2025-03-29 RX ORDER — ACETAMINOPHEN 500 MG
500 TABLET ORAL EVERY 6 HOURS PRN
Qty: 30 TABLET | Refills: 0 | Status: SHIPPED | OUTPATIENT
Start: 2025-03-29

## 2025-03-29 RX ORDER — DICLOFENAC SODIUM 10 MG/G
2 GEL TOPICAL 4 TIMES DAILY PRN
Qty: 200 G | Refills: 0 | Status: SHIPPED | OUTPATIENT
Start: 2025-03-29

## 2025-03-29 RX ADMIN — METHOCARBAMOL 1000 MG: 500 TABLET ORAL at 08:03

## 2025-03-29 RX ADMIN — KETOROLAC TROMETHAMINE 10 MG: 10 TABLET, FILM COATED ORAL at 08:03

## 2025-03-29 NOTE — Clinical Note
"Brandan"Antonina Santiago was seen and treated in our emergency department on 3/29/2025.  He may return to work on 03/31/2025.       If you have any questions or concerns, please don't hesitate to call.      Lizbeth Santiago, DO"
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
## 2025-06-06 ENCOUNTER — HOSPITAL ENCOUNTER (EMERGENCY)
Facility: HOSPITAL | Age: 30
Discharge: HOME | End: 2025-06-06
Attending: EMERGENCY MEDICINE
Payer: COMMERCIAL

## 2025-06-06 VITALS
HEIGHT: 73 IN | TEMPERATURE: 98 F | RESPIRATION RATE: 20 BRPM | SYSTOLIC BLOOD PRESSURE: 134 MMHG | HEART RATE: 67 BPM | OXYGEN SATURATION: 98 % | WEIGHT: 315 LBS | DIASTOLIC BLOOD PRESSURE: 74 MMHG | BODY MASS INDEX: 41.75 KG/M2

## 2025-06-06 DIAGNOSIS — K59.00 CONSTIPATION, UNSPECIFIED CONSTIPATION TYPE: Primary | ICD-10-CM

## 2025-06-06 DIAGNOSIS — R10.13 EPIGASTRIC ABDOMINAL PAIN: ICD-10-CM

## 2025-06-06 LAB
ALBUMIN SERPL-MCNC: 3.9 G/DL (ref 3.3–5.5)
ALBUMIN SERPL-MCNC: 4.1 G/DL (ref 3.3–5.5)
ALP SERPL-CCNC: 35 U/L (ref 42–141)
ALP SERPL-CCNC: 51 U/L (ref 42–141)
BILIRUB SERPL-MCNC: 0.6 MG/DL (ref 0.2–1.6)
BILIRUB SERPL-MCNC: 0.6 MG/DL (ref 0.2–1.6)
BILIRUBIN, POC UA: NEGATIVE
BLOOD, POC UA: NEGATIVE
BUN SERPL-MCNC: 15 MG/DL (ref 7–22)
CALCIUM SERPL-MCNC: 9.3 MG/DL (ref 8–10.3)
CHLORIDE SERPL-SCNC: 108 MMOL/L (ref 98–108)
CLARITY, UA: CLEAR
COLOR, UA: YELLOW
CREAT SERPL-MCNC: 1.3 MG/DL (ref 0.6–1.2)
GLUCOSE SERPL-MCNC: 103 MG/DL (ref 73–118)
GLUCOSE, POC UA: NEGATIVE
HCT, POC: NORMAL
HGB, POC: NORMAL (ref 14–18)
KETONES, POC UA: NEGATIVE
LEUKOCYTE EST, POC UA: NEGATIVE
MCH, POC: NORMAL
MCHC, POC: NORMAL
MCV, POC: NORMAL
MPV, POC: NORMAL
NITRITE, POC UA: NEGATIVE
OHS QRS DURATION: 84 MS
OHS QTC CALCULATION: 413 MS
PH UR STRIP: 6.5 [PH] (ref 5–8)
POC ALT (SGPT): 27 U/L (ref 10–47)
POC ALT (SGPT): 32 U/L (ref 10–47)
POC AMYLASE: 59 U/L (ref 14–97)
POC AST (SGOT): 21 U/L (ref 11–38)
POC AST (SGOT): 22 U/L (ref 11–38)
POC GGT: 20 U/L (ref 5–65)
POC PLATELET COUNT: NORMAL
POC TCO2: 28 MMOL/L (ref 18–33)
POTASSIUM BLD-SCNC: 4.5 MMOL/L (ref 3.6–5.1)
PROTEIN, POC UA: NEGATIVE
PROTEIN, POC: 7.3 G/DL (ref 6.4–8.1)
PROTEIN, POC: 7.5 G/DL (ref 6.4–8.1)
RBC, POC: NORMAL
RDW, POC: NORMAL
SODIUM BLD-SCNC: 143 MMOL/L (ref 128–145)
SPECIFIC GRAVITY, POC UA: 1.02 (ref 1–1.03)
UROBILINOGEN, POC UA: 0.2 E.U./DL
WBC, POC: NORMAL

## 2025-06-06 PROCEDURE — 80053 COMPREHEN METABOLIC PANEL: CPT | Mod: ER

## 2025-06-06 PROCEDURE — 82040 ASSAY OF SERUM ALBUMIN: CPT | Mod: 59,ER

## 2025-06-06 PROCEDURE — 99284 EMERGENCY DEPT VISIT MOD MDM: CPT | Mod: 25,ER

## 2025-06-06 PROCEDURE — 93010 ELECTROCARDIOGRAM REPORT: CPT | Mod: ,,, | Performed by: INTERNAL MEDICINE

## 2025-06-06 PROCEDURE — 93005 ELECTROCARDIOGRAM TRACING: CPT | Mod: ER

## 2025-06-06 PROCEDURE — 85025 COMPLETE CBC W/AUTO DIFF WBC: CPT | Mod: ER

## 2025-06-06 PROCEDURE — 25000003 PHARM REV CODE 250: Mod: ER | Performed by: EMERGENCY MEDICINE

## 2025-06-06 PROCEDURE — 82150 ASSAY OF AMYLASE: CPT | Mod: ER

## 2025-06-06 RX ORDER — ALUMINUM HYDROXIDE, MAGNESIUM HYDROXIDE, AND SIMETHICONE 1200; 120; 1200 MG/30ML; MG/30ML; MG/30ML
30 SUSPENSION ORAL ONCE
Status: COMPLETED | OUTPATIENT
Start: 2025-06-06 | End: 2025-06-06

## 2025-06-06 RX ORDER — LIDOCAINE HYDROCHLORIDE 20 MG/ML
15 SOLUTION OROPHARYNGEAL ONCE
Status: COMPLETED | OUTPATIENT
Start: 2025-06-06 | End: 2025-06-06

## 2025-06-06 RX ORDER — POLYETHYLENE GLYCOL 3350 17 G/17G
17 POWDER, FOR SOLUTION ORAL 2 TIMES DAILY
Qty: 238 G | Refills: 0 | Status: SHIPPED | OUTPATIENT
Start: 2025-06-06 | End: 2025-06-13

## 2025-06-06 RX ORDER — SENNOSIDES 8.6 MG/1
2 TABLET ORAL 2 TIMES DAILY
Qty: 40 TABLET | Refills: 0 | Status: SHIPPED | OUTPATIENT
Start: 2025-06-06 | End: 2025-06-16

## 2025-06-06 RX ADMIN — LIDOCAINE HYDROCHLORIDE 15 ML: 20 SOLUTION ORAL at 06:06

## 2025-06-06 RX ADMIN — ALUMINUM HYDROXIDE, MAGNESIUM HYDROXIDE, AND SIMETHICONE 30 ML: 200; 200; 20 SUSPENSION ORAL at 06:06

## 2025-06-06 RX ADMIN — SODIUM CHLORIDE 500 ML: 9 INJECTION, SOLUTION INTRAVENOUS at 07:06

## 2025-06-06 NOTE — Clinical Note
"Brandan Santiago (Travon) was seen and treated in our emergency department on 6/6/2025.  He may return to work on 06/08/2025.       If you have any questions or concerns, please don't hesitate to call.       RN    "

## 2025-06-06 NOTE — ED PROVIDER NOTES
"Encounter Date: 6/6/2025       History     Chief Complaint   Patient presents with    Abdominal Pain     Pt reports abdominal pain, vomiting, constipated x 4 days.      Patient is a 29-year-old man presenting to the emergency department for abdominal pain, vomiting, constipation since Sunday.  He states that he and his family returned from a water park and everyone was feeling sick.  He states that he had been unable to pass stool for several days then drank a bottle of magnesium citrate causing him to have a normal bowel movement yesterday.  He states that his vomiting resolved with the passing of this stool.  He denies fevers or chills.  He denies loss of appetite.  Patient states he is prediabetic but that "my numbers are going down".      Review of patient's allergies indicates:  No Known Allergies  History reviewed. No pertinent past medical history.  History reviewed. No pertinent surgical history.  Family History   Problem Relation Name Age of Onset    Hypertension Mother       Social History[1]  Review of Systems   Constitutional:  Negative for fever.   Respiratory:  Negative for chest tightness and shortness of breath.    Cardiovascular:  Negative for chest pain.   Gastrointestinal:  Positive for abdominal pain (Epigastric), constipation, nausea and vomiting. Negative for blood in stool and diarrhea.   Genitourinary:  Negative for dysuria.   Neurological:  Negative for weakness and headaches.       Physical Exam     Initial Vitals [06/06/25 0553]   BP Pulse Resp Temp SpO2   (!) 161/93 75 20 98.9 °F (37.2 °C) 96 %      MAP       --         Physical Exam    Nursing note and vitals reviewed.  Constitutional: He appears well-developed and well-nourished.   HENT:   Head: Normocephalic.   Eyes: Conjunctivae are normal.   Neck: Neck supple.   Cardiovascular:  Regular rhythm and normal heart sounds.           Pulmonary/Chest: Breath sounds normal. No respiratory distress. He has no wheezes.   Abdominal: Abdomen is " soft. He exhibits no distension and no mass. There is abdominal tenderness (Epigastric only). There is no rebound and no guarding.   Musculoskeletal:         General: Normal range of motion.      Cervical back: Neck supple.     Neurological: He is alert.   Skin: Skin is warm and dry.   Psychiatric: He has a normal mood and affect.         ED Course   Procedures  Labs Reviewed   POCT CMP - Abnormal       Result Value    Albumin, POC 3.9      Alkaline Phosphatase, POC 35 (*)     ALT (SGPT), POC 32      AST (SGOT), POC 22      POC BUN 15      Calcium, POC 9.3      POC Chloride 108      POC Creatinine 1.3 (*)     POC Glucose 103      POC Potassium 4.5      POC Sodium 143      Bilirubin, POC 0.6      POC TCO2 28      Protein, POC 7.3     POCT CBC    Hematocrit        Hemoglobin        RBC        WBC        MCV        MCH, POC        MCHC        RDW-CV        Platelet Count, POC        MPV       POCT URINALYSIS(INSTRUMENT)   POCT CMP   POCT LIVER PANEL   POCT LIVER PANEL    Albumin, POC 4.1      Alkaline Phosphatase, POC 51      ALT (SGPT), POC 27      Amylase, POC 59      AST (SGOT), POC 21      POC GGT 20      Bilirubin, POC 0.6      Protein, POC 7.5     POCT URINALYSIS W/O SCOPE    Glucose, UA Negative      Bilirubin, UA Negative      Ketones, UA Negative      Spec Grav UA 1.025      Blood, UA Negative      PH, UA 6.5      Protein, UA Negative      Urobilinogen, UA 0.2      Nitrite, UA Negative      Leukocytes, UA Negative      Color, UA POC Yellow      Clarity, UA, POC Clear       EKG Readings: (Independently Interpreted)   Initial Reading: No STEMI. Rhythm: Normal Sinus Rhythm. Heart Rate: 72. Ectopy: No Ectopy.       Imaging Results              X-Ray Abdomen Flat And Erect (Final result)  Result time 06/06/25 07:32:44      Final result by Matthias Byrd MD (06/06/25 07:32:44)                   Impression:      Nonobstructive bowel gas pattern.      Electronically signed by: Matthias Byrd  MD  Date:    06/06/2025  Time:    07:32               Narrative:    EXAMINATION:  XR ABDOMEN FLAT AND ERECT    CLINICAL HISTORY:  Abdominal Pain;    TECHNIQUE:  Flat and erect frontal views of the abdomen were performed.    COMPARISON:  None.    FINDINGS:  Bowel gas pattern is nonobstructive.  No evidence of pneumoperitoneum.  No large volume fecal burden.  No pathologic calcifications.  Bones are unremarkable.                                       Medications   aluminum-magnesium hydroxide-simethicone 200-200-20 mg/5 mL suspension 30 mL (30 mLs Oral Given 6/6/25 0630)     And   LIDOcaine viscous HCl 2% oral solution 15 mL (15 mLs Oral Given 6/6/25 0630)   sodium chloride 0.9% bolus 500 mL 500 mL (0 mLs Intravenous Stopped 6/6/25 0741)     Medical Decision Making  This is an emergent evaluation of a 29-year-old man presenting to the emergency department today for evaluation of constipation, nausea, vomiting, and epigastric abdominal pain.  Differential diagnoses include gastritis, pancreatitis, viral syndrome, amongst others.  On physical examination, patient was in no acute distress.  Heart and lung sounds were within normal limits.  Abdomen was soft, nondistended, but tender to palpation in the epigastrium.  I have ordered labs, a GI cocktail, and an EKG given patient is prediabetic and obese.  Disposition is pending.    Carmen Hendricks MD  6:18 AM  6/6/2025     Labs resulted and showed an elevation in his creatinine for which IV fluids were provided.  X-ray resulted with a nonobstructive bowel gas pattern as well as stool.  Patient reported improvement in symptoms after receiving IV fluids.  He stated he was ready for discharge.  Patient was therefore discharged to home with MiraLax and senna.  I advised to avoid Mag citrate while taking these medications.  Close follow-up with his primary care physician was advised, return precautions were given, and patient was discharged home in stable condition.    Carmen Hendricks MD   7:39 AM  6/6/2025       Amount and/or Complexity of Data Reviewed  Labs: ordered.  Radiology: ordered.    Risk  OTC drugs.  Prescription drug management.               ED Course as of 06/06/25 0832   Fri Jun 06, 2025   0618 Patient evaluated. GI cocktail, labs, imaging ordered.  [EM]   0705 Patient's labs c/o elevated creatinine. Normal saline ordered. XRay results pending. Patient updated.  [EM]   0738 Xray resulted with nonobstructive bowel gas pattern. Patient states he is feeling better with IV fluids and requests a work note, which has been provided.  [EM]      ED Course User Index  [EM] Carmen Hendricks MD                           Clinical Impression:  Final diagnoses:  [R10.13] Epigastric abdominal pain  [K59.00] Constipation, unspecified constipation type (Primary)          ED Disposition Condition    Discharge Stable          ED Prescriptions       Medication Sig Dispense Start Date End Date Auth. Provider    polyethylene glycol (GLYCOLAX) 17 gram/dose powder Take 17 g by mouth 2 (two) times daily. Dissolve 1 cap full (tbsp) in 8 oz of water and drink twice daily until normal stooling occurs.  Then decrease to once daily dosing as needed. for 7 days 238 g 6/6/2025 6/13/2025 Carmen Hendricks MD    SENNA 8.6 mg tablet Take 2 tablets by mouth 2 (two) times daily. Take twice daily until normal, soft stools. THEN, decrease to once daily dosing as needed for constipation. for 10 days 40 tablet 6/6/2025 6/16/2025 Carmen Hendricks MD          Follow-up Information       Follow up With Specialties Details Why Contact [facility]    [facility] -  Schedule an appointment as soon as possible for a visit   [address]  [place] [address]  [phone]                   Carmen Hendricks MD  06/06/25 8250         [1]   Social History  Tobacco Use    Smoking status: Never    Smokeless tobacco: Never   Vaping Use    Vaping status: Never Used   Substance Use Topics    Alcohol use: Yes     Comment: 3 x a week    Drug  use: No        Carmen Hendricks MD  06/06/25 0820

## 2025-06-06 NOTE — Clinical Note
"Brandan"Antonina Santiago was seen and treated in our emergency department on 6/6/2025.  He may return to work on 06/07/2025.       If you have any questions or concerns, please don't hesitate to call.      Carmen Hendricks MD"

## 2025-06-19 ENCOUNTER — HOSPITAL ENCOUNTER (EMERGENCY)
Facility: HOSPITAL | Age: 30
Discharge: HOME | End: 2025-06-19
Attending: EMERGENCY MEDICINE
Payer: COMMERCIAL

## 2025-06-19 VITALS
TEMPERATURE: 99 F | WEIGHT: 315 LBS | HEART RATE: 97 BPM | OXYGEN SATURATION: 96 % | DIASTOLIC BLOOD PRESSURE: 97 MMHG | HEIGHT: 73 IN | RESPIRATION RATE: 18 BRPM | BODY MASS INDEX: 41.75 KG/M2 | SYSTOLIC BLOOD PRESSURE: 160 MMHG

## 2025-06-19 DIAGNOSIS — L05.01 PILONIDAL ABSCESS: Primary | ICD-10-CM

## 2025-06-19 PROCEDURE — 99283 EMERGENCY DEPT VISIT LOW MDM: CPT | Mod: 25,ER

## 2025-06-19 PROCEDURE — 10080 I&D PILONIDAL CYST SIMPLE: CPT | Mod: ER

## 2025-06-19 PROCEDURE — 63600175 PHARM REV CODE 636 W HCPCS: Mod: ER

## 2025-06-19 RX ORDER — LIDOCAINE HYDROCHLORIDE 10 MG/ML
10 INJECTION, SOLUTION INFILTRATION; PERINEURAL
Status: COMPLETED | OUTPATIENT
Start: 2025-06-19 | End: 2025-06-19

## 2025-06-19 RX ORDER — IBUPROFEN 800 MG/1
800 TABLET, FILM COATED ORAL EVERY 6 HOURS PRN
Qty: 20 TABLET | Refills: 0 | Status: SHIPPED | OUTPATIENT
Start: 2025-06-19

## 2025-06-19 RX ORDER — DOXYCYCLINE 100 MG/1
100 CAPSULE ORAL 2 TIMES DAILY
Qty: 14 CAPSULE | Refills: 0 | Status: SHIPPED | OUTPATIENT
Start: 2025-06-19 | End: 2025-06-26

## 2025-06-19 RX ADMIN — LIDOCAINE HYDROCHLORIDE 10 ML: 10 INJECTION, SOLUTION INFILTRATION; PERINEURAL at 10:06

## 2025-06-19 NOTE — ED PROVIDER NOTES
Encounter Date: 6/19/2025    SCRIBE #1 NOTE: I, Amy Raymond, am scribing for, and in the presence of,  Carmen Hendricks MD. I have scribed the following portions of the note - Other sections scribed: HPI,ROS,PE.       History     Chief Complaint   Patient presents with    Abscess     A 30 y/o male presents to the ER c/o9 abscess to lower back x 2 days.  Denies drainage  +Pain.      29 y.o. male with PMHx of reported pre-diabetes, HTN on losartan, presents for emergent evaluation of abscess to lower back that began 2 days ago. He notes pain to the area worse with sitting or walking. Reports minimal yellow drainage from site after taking a shower yesterday but none since then. Patient has not attempted treatment for symptoms at this time.  Denies history of past abscess. Patient denies fever/chills, headache, chest pain, shortness of breath, abdominal pain, nausea/vomiting/diarrhea, urinary concerns, myalgias, or any other complaints at this time.     The history is provided by the patient. No  was used.     Review of patient's allergies indicates:  No Known Allergies  History reviewed. No pertinent past medical history.  History reviewed. No pertinent surgical history.  Family History   Problem Relation Name Age of Onset    Hypertension Mother       Social History[1]  Review of Systems   Constitutional:  Negative for chills and fever.   HENT:  Negative for sore throat.    Respiratory:  Negative for shortness of breath.    Cardiovascular:  Negative for chest pain.   Gastrointestinal:  Negative for abdominal pain, diarrhea, nausea and vomiting.   Genitourinary:  Negative for decreased urine volume, dysuria, hematuria and testicular pain.   Musculoskeletal:  Negative for myalgias.   Skin:  Positive for wound (abscess). Negative for rash.   Neurological:  Negative for weakness and headaches.   Psychiatric/Behavioral: Negative.         Physical Exam     Initial Vitals [06/19/25 0948]   BP Pulse Resp  Temp SpO2   (!) 160/97 97 18 98.8 °F (37.1 °C) 96 %      MAP       --         Physical Exam    Nursing note and vitals reviewed.  Constitutional: He appears well-developed and well-nourished. He is not diaphoretic. No distress.   HENT:   Head: Normocephalic and atraumatic.   Right Ear: External ear normal.   Left Ear: External ear normal.   Eyes: Conjunctivae and EOM are normal.   Neck: Neck supple.   Normal range of motion.  Cardiovascular:  Normal rate.           Pulmonary/Chest: No stridor. No respiratory distress.   Abdominal: Abdomen is soft. He exhibits no distension. There is no abdominal tenderness. There is no rebound.   Musculoskeletal:         General: Normal range of motion.      Cervical back: Normal range of motion and neck supple.     Neurological: He is alert and oriented to person, place, and time.   Skin: Abscess noted. No rash noted.   Tender, fluctuant abscess to gluteal cleft consistent with pilonidal abscess.  Minimal surrounding erythema. No active drainage.   Psychiatric: He has a normal mood and affect. Thought content normal.         ED Course   I & D - Incision and Drainage    Date/Time: 6/19/2025 10:49 AM  Location procedure was performed: [facility] EMERGENCY DEPARTMENT    Performed by: Heather Navarro PA-C  Authorized by: Carmen Hendricks MD  Assisting provider: Heather Navarro PA-C  Pre-operative diagnosis: pilonidal abscess  Consent Done: Yes  Consent: Verbal consent obtained  Risks and benefits: risks, benefits and alternatives were discussed  Type: pilonidal cyst  Anesthesia: local infiltration    Anesthesia:  Local Anesthetic: lidocaine 1% without epinephrine  Scalpel size: 11  Incision type: single straight  Complexity: simple  Drainage: pus, serosanguinous, bloody and purulent  Drainage amount: copious  Wound treatment: incision, drainage, expression of material, wound packed and deloculation  Packing material: 1/4 in gauze  Patient tolerance: Patient tolerated the procedure well  with no immediate complications        Labs Reviewed - No data to display       Imaging Results    None          Medications   LIDOcaine HCL 10 mg/ml (1%) injection 10 mL (10 mLs Infiltration Given 6/19/25 1054)     Medical Decision Making  This is an emergent evaluation of a 29 y.o. male presenting to the ED for abscess. Afebrile. Patient is non-toxic appearing and in no acute distress. Presentation consistent with pilonidal abscess associated with cellulitis. No symptoms for systemic infection or evidence of necrotizing fasciitis.      Fluid collection drained and packed. Tetanus UTD per patient. Discharged with antibiotics. Advised on appropriate wound care. Packing removal in 48 hours. Instructed to follow up with PCP.     I discussed with the patient the diagnosis, treatment plan, indications for return to the emergency department, and for expected follow-up. The patient verbalized an understanding. The patient is asked if there are any questions or concerns. We discuss the case, until all issues are addressed to the patient's satisfaction. Patient understands and is agreeable to the plan.     Risk  Prescription drug management.            Scribe Attestation:   Scribe #1: I performed the above scribed service and the documentation accurately describes the services I performed. I attest to the accuracy of the note.                               Clinical Impression:  Final diagnoses:  [L05.01] Pilonidal abscess (Primary)          ED Disposition Condition    Discharge Stable          ED Prescriptions       Medication Sig Dispense Start Date End Date Auth. Provider    doxycycline (VIBRAMYCIN) 100 MG Cap Take 1 capsule (100 mg total) by mouth 2 (two) times daily. for 7 days 14 capsule 6/19/2025 6/26/2025 Heather Navarro PA-C    ibuprofen (ADVIL,MOTRIN) 800 MG tablet Take 1 tablet (800 mg total) by mouth every 6 (six) hours as needed for Pain. 20 tablet 6/19/2025 -- Heather Navarro PA-C          Follow-up  Information       Follow up With Specialties Details Why Contact Info    [facility] ED Emergency Medicine Go to  For new or worsening symptoms [address] [facility] [address] [phone]    [facility] -  Schedule an appointment as soon as possible for a visit   [address] [place] [id number] [phone]            JACQUE CARMEN, Emergency Medicine PAEMILIA, personally performed the services described in this documentation. All medical record entries made by the scribe were at my direction and in my presence. I have reviewed the chart and agree that the record reflects my personal performance and is accurate and complete.      DISCLAIMER: This note was prepared with [employer] voice recognition transcription software. Garbled syntax, mangled pronouns, and other bizarre constructions may be attributed to that software system.         Heather Navarro PA-C  06/20/25 0114         [1]   Social History  Tobacco Use    Smoking status: Never    Smokeless tobacco: Never   Vaping Use    Vaping status: Never Used   Substance Use Topics    Alcohol use: Yes     Comment: 3 x a week    Drug use: No        Heather Navarro PA-C  06/20/25 1135

## 2025-06-19 NOTE — DISCHARGE INSTRUCTIONS
Complete oral antibiotics as prescribed.  Please keep your wound clean and dry.  He will need to have the packing removed in 72 hours.  You may do this yourself or return to the ER/PCP if you feel like you may need assistance. Drink lots of fluids, stay well hydrated. Alternate Tylenol/Ibuprofen as needed for pain/ fever; go back and forth between these two medications every 4 hrs as needed for temp greater than or equal to 100.4F.  You may take 800 ibuprofen and 500 to a 1000 mg of Tylenol at 1 time.  Maximum dose of Tylenol and 1 day is 3000 mg in the maximum dose of ibuprofen and 1 day is 1200mg.  Please watch for signs of infection including: increased\spreading redness, swelling, pus-like discharge, or a fever greater than 100.4F. If you experience any of these, please contact your Primary Care Doctor or Return to the Emergency Department for a wound check.